# Patient Record
Sex: FEMALE | Race: WHITE | NOT HISPANIC OR LATINO
[De-identification: names, ages, dates, MRNs, and addresses within clinical notes are randomized per-mention and may not be internally consistent; named-entity substitution may affect disease eponyms.]

---

## 2022-05-05 PROBLEM — Z00.00 ENCOUNTER FOR PREVENTIVE HEALTH EXAMINATION: Status: ACTIVE | Noted: 2022-05-05

## 2022-05-17 ENCOUNTER — APPOINTMENT (OUTPATIENT)
Dept: CARDIOTHORACIC SURGERY | Facility: CLINIC | Age: 72
End: 2022-05-17
Payer: MEDICARE

## 2022-05-17 VITALS
RESPIRATION RATE: 14 BRPM | TEMPERATURE: 97.6 F | HEIGHT: 61 IN | OXYGEN SATURATION: 97 % | BODY MASS INDEX: 24.55 KG/M2 | HEART RATE: 95 BPM | WEIGHT: 130 LBS

## 2022-05-17 DIAGNOSIS — S83.249A OTHER TEAR OF MEDIAL MENISCUS, CURRENT INJURY, UNSPECIFIED KNEE, INITIAL ENCOUNTER: ICD-10-CM

## 2022-05-17 DIAGNOSIS — Z82.49 FAMILY HISTORY OF ISCHEMIC HEART DISEASE AND OTHER DISEASES OF THE CIRCULATORY SYSTEM: ICD-10-CM

## 2022-05-17 DIAGNOSIS — Z83.6 FAMILY HISTORY OF OTHER DISEASES OF THE RESPIRATORY SYSTEM: ICD-10-CM

## 2022-05-17 DIAGNOSIS — Z86.79 PERSONAL HISTORY OF OTHER DISEASES OF THE CIRCULATORY SYSTEM: ICD-10-CM

## 2022-05-17 DIAGNOSIS — Z87.891 PERSONAL HISTORY OF NICOTINE DEPENDENCE: ICD-10-CM

## 2022-05-17 DIAGNOSIS — I65.23 OCCLUSION AND STENOSIS OF BILATERAL CAROTID ARTERIES: ICD-10-CM

## 2022-05-17 DIAGNOSIS — I35.0 NONRHEUMATIC AORTIC (VALVE) STENOSIS: ICD-10-CM

## 2022-05-17 DIAGNOSIS — E78.5 HYPERLIPIDEMIA, UNSPECIFIED: ICD-10-CM

## 2022-05-17 DIAGNOSIS — I25.10 ATHEROSCLEROTIC HEART DISEASE OF NATIVE CORONARY ARTERY W/OUT ANGINA PECTORIS: ICD-10-CM

## 2022-05-17 DIAGNOSIS — Z86.59 PERSONAL HISTORY OF OTHER MENTAL AND BEHAVIORAL DISORDERS: ICD-10-CM

## 2022-05-17 PROCEDURE — 99214 OFFICE O/P EST MOD 30 MIN: CPT

## 2022-05-17 NOTE — CONSULT LETTER
[Dear  ___] : Dear ~WADE, [Courtesy Letter:] : I had the pleasure of seeing your patient, [unfilled], in my office today. [Please see my note below.] : Please see my note below. [Consult Closing:] : Thank you very much for allowing me to participate in the care of this patient.  If you have any questions, please do not hesitate to contact me. [Sincerely,] : Sincerely, [FreeTextEntry2] : Ayesha Humphries MD\par [address]\par [place] [id number] [FreeTextEntry3] : Len Chang MD\par  & \par \par Cardiovascular & Thoracic Surgery\par [facility] \par [address]\par [facility] [address]\par

## 2022-05-17 NOTE — HISTORY OF PRESENT ILLNESS
[FreeTextEntry1] : Gwen is a 71 year old female with past medical history of known heart murmur, HTN, HLD, visual disturbances, anxiety disorder, carotid artery disease, aortic valve disease and occlusive coronary artery disease. She was revealed for surgical consultation by Dr. Humphries.  In 2020 she underwent a routine physical exam with her PCP EKG was found to be abnormal. She followed up with cardiology (Dr. Humphries) which included an echocardiogram.  At that time Dr. Humphries told patient her aortic valve was stenotic but did not warrant surgery at that time. Recent echocardiogram showed progression of her aortic valve disease and recent cardiac catherization showed two vessel coronary artery disease. She was referred for surgical consultation.  \par \par Today she denies angina, SOB, SANCHEZ, PND, orthopnea or syncope. Her appetite is good, bowel and bladder habits are normal. She is an active skier but this year she did not go this year due to her recent echocardiogram results as well as Dr. Humphries's recommendations. 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 \par \par Cardiac Catherization on 4/29/2022 by Dr. Jennings demonstrated\par Left Cx 70%, \par RCA 85%\par LAD 30%\par \par TTE 2/2/2022 demonstrated\par severe AS, pGr 88 mmHg, mGr 49 mmHg, \par EF is normal

## 2022-05-17 NOTE — REVIEW OF SYSTEMS
[Feeling Tired] : feeling tired [Arthralgias] : arthralgias [Joint Swelling] : joint swelling [Joint Stiffness] : joint stiffness [Anxiety] : anxiety [Negative] : Gastrointestinal [Chest Pain] : no chest pain [Palpitations] : no palpitations [Lower Ext Edema] : no lower extremity edema [SOB on Exertion] : no shortness of breath during exertion [Dizziness] : no dizziness [Fainting] : no fainting [Easy Bleeding] : no tendency for easy bleeding

## 2022-05-17 NOTE — REASON FOR VISIT
[Initial Evaluation] : an initial evaluation [Spouse] : spouse [FreeTextEntry1] : coronary artery disease and aortic valve disease

## 2022-05-17 NOTE — ASSESSMENT
[FreeTextEntry1] : Gwen is a 71 year old female with past medical history of known heart murmur, HTN, HLD, visual disturbances, anxiety disorder, carotid artery disease, aortic valve disease and occlusive coronary artery disease. She was revealed for surgical consultation by Dr. Humphries.  In 2020 she underwent a routine physical exam with her PCP EKG was found to be abnormal. She followed up with cardiology (Dr. Humphries) which included an echocardiogram.  At that time Dr. Humphries told patient her aortic valve was stenotic but did not warrant surgery at that time. Recent echocardiogram showed progression of her aortic valve disease and recent cardiac catherization showed two vessel coronary artery disease. She was referred for surgical consultation.  \par \par Today she denies angina, SOB, SANCHEZ, PND, orthopnea or syncope. Her appetite is good, bowel and bladder habits are normal. She is an active skier but this year she did not go this year due to her recent echocardiogram results as well as Dr. Humphries's recommendations. 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 \par \par Cardiac Catherization on 4/29/2022 by Dr. Jennings demonstrated\par Left Cx 70%, \par RCA 85%\par LAD 30%\par \par TTE 2/2/2022 demonstrated\par severe AS, pGr 88 mmHg, mGr 49 mmHg, \par EF is normal\par \par I have reviewed the patient's medical records, diagnostic images during the time of this office consultation and have made the following recommendations.  Review of the imaging shows severe AS and occlusive coronary artery disease that will require surgical intervention. \par \par Plan:\par 1. Coronary artery revascularization and aortic valve replacement \par 2. Continue medication regimen (Toprol 25mg daily)\par 3. Follow up with cardiologist and PCP\par 4. STOP ASPIRIN TODAY for UPCOMING SURGERY \par 5. Will obtain CATH films \par

## 2022-05-17 NOTE — END OF VISIT
[FreeTextEntry2] : I personally performed the services described in the documentation, reviewed the documentation recorded by the scribe in my presence and it accurately and completely records my words and actions.\par \par I, Olena Luis NP, am scribing for and the presence of Dr. Len Chang, the following sections HISTORY OF PRESENT ILLNESS, PAST MEDICAL/FAMILY/SOCIAL HISTORY; REVIEW OF SYSTEMS; VITAL SIGNS; PHYSICAL EXAM; DISPOSITION.\par

## 2022-05-17 NOTE — PHYSICAL EXAM
[General Appearance - Alert] : alert [Jugular Venous Distention Increased] : there was no jugular-venous distention [] : no respiratory distress [Respiration, Rhythm And Depth] : normal respiratory rhythm and effort [III] : a grade 3 [II] : a grade 2 [Examination Of The Chest] : the chest was normal in appearance [Bowel Sounds] : normal bowel sounds [Abdomen Soft] : soft [No CVA Tenderness] : no ~M costovertebral angle tenderness [Involuntary Movements] : no involuntary movements were seen [Skin Color & Pigmentation] : normal skin color and pigmentation [No Focal Deficits] : no focal deficits [Oriented To Time, Place, And Person] : oriented to person, place, and time [Impaired Insight] : insight and judgment were intact [Right Carotid Bruit] : no bruit heard over the right carotid [Left Carotid Bruit] : no bruit heard over the left carotid [FreeTextEntry1] : deferred

## 2022-05-17 NOTE — DATA REVIEWED
[FreeTextEntry1] : Cardiac Catherization on 4/29/2022 by Dr. Jennings demonstrated\par Left Cx 70%, \par RCA 85%\par LAD 30%\par \par TTE 2/2/2022 demonstrated\par severe AS, pGr 88mmHg, mGr 49 mmHg, \par EF is normal

## 2022-05-20 ENCOUNTER — APPOINTMENT (OUTPATIENT)
Dept: ULTRASOUND IMAGING | Facility: HOSPITAL | Age: 72
End: 2022-05-20

## 2022-05-20 ENCOUNTER — OUTPATIENT (OUTPATIENT)
Dept: OUTPATIENT SERVICES | Facility: HOSPITAL | Age: 72
LOS: 1 days | End: 2022-05-20
Payer: MEDICARE

## 2022-05-20 VITALS
DIASTOLIC BLOOD PRESSURE: 88 MMHG | HEART RATE: 72 BPM | TEMPERATURE: 98 F | RESPIRATION RATE: 18 BRPM | OXYGEN SATURATION: 99 % | HEIGHT: 61 IN | WEIGHT: 128.97 LBS | SYSTOLIC BLOOD PRESSURE: 130 MMHG

## 2022-05-20 DIAGNOSIS — Z01.818 ENCOUNTER FOR OTHER PREPROCEDURAL EXAMINATION: ICD-10-CM

## 2022-05-20 DIAGNOSIS — I25.10 ATHEROSCLEROTIC HEART DISEASE OF NATIVE CORONARY ARTERY WITHOUT ANGINA PECTORIS: ICD-10-CM

## 2022-05-20 DIAGNOSIS — I35.0 NONRHEUMATIC AORTIC (VALVE) STENOSIS: ICD-10-CM

## 2022-05-20 DIAGNOSIS — Z98.49 CATARACT EXTRACTION STATUS, UNSPECIFIED EYE: Chronic | ICD-10-CM

## 2022-05-20 LAB
A1C WITH ESTIMATED AVERAGE GLUCOSE RESULT: 6 % — HIGH (ref 4–5.6)
ANION GAP SERPL CALC-SCNC: 13 MMOL/L — SIGNIFICANT CHANGE UP (ref 5–17)
BLD GP AB SCN SERPL QL: NEGATIVE — SIGNIFICANT CHANGE UP
BUN SERPL-MCNC: 21 MG/DL — SIGNIFICANT CHANGE UP (ref 7–23)
CALCIUM SERPL-MCNC: 9.6 MG/DL — SIGNIFICANT CHANGE UP (ref 8.4–10.5)
CHLORIDE SERPL-SCNC: 102 MMOL/L — SIGNIFICANT CHANGE UP (ref 96–108)
CO2 SERPL-SCNC: 24 MMOL/L — SIGNIFICANT CHANGE UP (ref 22–31)
CREAT SERPL-MCNC: 0.77 MG/DL — SIGNIFICANT CHANGE UP (ref 0.5–1.3)
EGFR: 81 ML/MIN/1.73M2 — SIGNIFICANT CHANGE UP
ESTIMATED AVERAGE GLUCOSE: 126 MG/DL — HIGH (ref 68–114)
GLUCOSE SERPL-MCNC: 112 MG/DL — HIGH (ref 70–99)
HCT VFR BLD CALC: 42.4 % — SIGNIFICANT CHANGE UP (ref 34.5–45)
HGB BLD-MCNC: 13.9 G/DL — SIGNIFICANT CHANGE UP (ref 11.5–15.5)
MCHC RBC-ENTMCNC: 30.2 PG — SIGNIFICANT CHANGE UP (ref 27–34)
MCHC RBC-ENTMCNC: 32.8 GM/DL — SIGNIFICANT CHANGE UP (ref 32–36)
MCV RBC AUTO: 92 FL — SIGNIFICANT CHANGE UP (ref 80–100)
MRSA PCR RESULT.: SIGNIFICANT CHANGE UP
NRBC # BLD: 0 /100 WBCS — SIGNIFICANT CHANGE UP (ref 0–0)
PLATELET # BLD AUTO: 208 K/UL — SIGNIFICANT CHANGE UP (ref 150–400)
POTASSIUM SERPL-MCNC: 4.3 MMOL/L — SIGNIFICANT CHANGE UP (ref 3.5–5.3)
POTASSIUM SERPL-SCNC: 4.3 MMOL/L — SIGNIFICANT CHANGE UP (ref 3.5–5.3)
RBC # BLD: 4.61 M/UL — SIGNIFICANT CHANGE UP (ref 3.8–5.2)
RBC # FLD: 13 % — SIGNIFICANT CHANGE UP (ref 10.3–14.5)
RH IG SCN BLD-IMP: POSITIVE — SIGNIFICANT CHANGE UP
S AUREUS DNA NOSE QL NAA+PROBE: SIGNIFICANT CHANGE UP
SODIUM SERPL-SCNC: 139 MMOL/L — SIGNIFICANT CHANGE UP (ref 135–145)
WBC # BLD: 5.82 K/UL — SIGNIFICANT CHANGE UP (ref 3.8–10.5)
WBC # FLD AUTO: 5.82 K/UL — SIGNIFICANT CHANGE UP (ref 3.8–10.5)

## 2022-05-20 PROCEDURE — 86850 RBC ANTIBODY SCREEN: CPT

## 2022-05-20 PROCEDURE — 83036 HEMOGLOBIN GLYCOSYLATED A1C: CPT

## 2022-05-20 PROCEDURE — G0463: CPT

## 2022-05-20 PROCEDURE — 87641 MR-STAPH DNA AMP PROBE: CPT

## 2022-05-20 PROCEDURE — 86900 BLOOD TYPING SEROLOGIC ABO: CPT

## 2022-05-20 PROCEDURE — 80048 BASIC METABOLIC PNL TOTAL CA: CPT

## 2022-05-20 PROCEDURE — 87640 STAPH A DNA AMP PROBE: CPT

## 2022-05-20 PROCEDURE — 86901 BLOOD TYPING SEROLOGIC RH(D): CPT

## 2022-05-20 PROCEDURE — 71046 X-RAY EXAM CHEST 2 VIEWS: CPT | Mod: 26

## 2022-05-20 PROCEDURE — 71046 X-RAY EXAM CHEST 2 VIEWS: CPT

## 2022-05-20 PROCEDURE — 93880 EXTRACRANIAL BILAT STUDY: CPT | Mod: 26

## 2022-05-20 PROCEDURE — 85027 COMPLETE CBC AUTOMATED: CPT

## 2022-05-20 PROCEDURE — 93880 EXTRACRANIAL BILAT STUDY: CPT

## 2022-05-20 RX ORDER — VANCOMYCIN HCL 1 G
750 VIAL (EA) INTRAVENOUS ONCE
Refills: 0 | Status: DISCONTINUED | OUTPATIENT
Start: 2022-06-16 | End: 2022-06-16

## 2022-05-20 NOTE — H&P PST ADULT - NSICDXPASTMEDICALHX_GEN_ALL_CORE_FT
PAST MEDICAL HISTORY:  Bicuspid aortic valve     HLD (hyperlipidemia)     Hypertension      PAST MEDICAL HISTORY:  Aortic stenosis     H/O carotid stenosis     HLD (hyperlipidemia)     Hypertension

## 2022-05-20 NOTE — H&P PST ADULT - FALL HARM RISK - UNIVERSAL INTERVENTIONS
Bed in lowest position, wheels locked, appropriate side rails in place/Call bell, personal items and telephone in reach/Instruct patient to call for assistance before getting out of bed or chair/Non-slip footwear when patient is out of bed/[place] to call system/Physically safe environment - no spills, clutter or unnecessary equipment/Purposeful Proactive Rounding/Room/bathroom lighting operational, light cord in reach

## 2022-05-20 NOTE — H&P PST ADULT - HISTORY OF PRESENT ILLNESS
This is a 70 y/o female PMH CAD, bicuspid aortic valve.  Presents today for aortic valve replacement and CABG X 3.   This is a 70 y/o female PMH CAD, severe aortic stenosis, was found to have TVD on cardiac catheterization performed at [facility] in April, denies dyspnea, angina or syncope.  Presents today for aortic valve replacement and CABG X 3.      No H/O COVID infection, COVID swab to be scheduled by CTS

## 2022-05-21 ENCOUNTER — TRANSCRIPTION ENCOUNTER (OUTPATIENT)
Age: 72
End: 2022-05-21

## 2022-05-24 PROBLEM — I35.0 NONRHEUMATIC AORTIC (VALVE) STENOSIS: Chronic | Status: ACTIVE | Noted: 2022-05-20

## 2022-05-24 PROBLEM — E78.5 HYPERLIPIDEMIA, UNSPECIFIED: Chronic | Status: ACTIVE | Noted: 2022-05-20

## 2022-05-24 PROBLEM — Z86.79 PERSONAL HISTORY OF OTHER DISEASES OF THE CIRCULATORY SYSTEM: Chronic | Status: ACTIVE | Noted: 2022-05-20

## 2022-05-24 PROBLEM — I10 ESSENTIAL (PRIMARY) HYPERTENSION: Chronic | Status: ACTIVE | Noted: 2022-05-20

## 2022-06-15 ENCOUNTER — TRANSCRIPTION ENCOUNTER (OUTPATIENT)
Age: 72
End: 2022-06-15

## 2022-06-16 ENCOUNTER — APPOINTMENT (OUTPATIENT)
Dept: CARDIOTHORACIC SURGERY | Facility: HOSPITAL | Age: 72
End: 2022-06-16

## 2022-06-16 ENCOUNTER — RESULT REVIEW (OUTPATIENT)
Age: 72
End: 2022-06-16

## 2022-06-16 ENCOUNTER — INPATIENT (INPATIENT)
Facility: HOSPITAL | Age: 72
LOS: 6 days | Discharge: HOME HEALTH | DRG: 219 | End: 2022-06-23
Attending: THORACIC SURGERY (CARDIOTHORACIC VASCULAR SURGERY) | Admitting: THORACIC SURGERY (CARDIOTHORACIC VASCULAR SURGERY)
Payer: MEDICARE

## 2022-06-16 VITALS
OXYGEN SATURATION: 99 % | HEIGHT: 61 IN | SYSTOLIC BLOOD PRESSURE: 169 MMHG | HEART RATE: 95 BPM | DIASTOLIC BLOOD PRESSURE: 89 MMHG | TEMPERATURE: 98 F | RESPIRATION RATE: 18 BRPM | WEIGHT: 129.41 LBS

## 2022-06-16 DIAGNOSIS — I35.0 NONRHEUMATIC AORTIC (VALVE) STENOSIS: ICD-10-CM

## 2022-06-16 DIAGNOSIS — I25.10 ATHEROSCLEROTIC HEART DISEASE OF NATIVE CORONARY ARTERY WITHOUT ANGINA PECTORIS: ICD-10-CM

## 2022-06-16 DIAGNOSIS — Z98.49 CATARACT EXTRACTION STATUS, UNSPECIFIED EYE: Chronic | ICD-10-CM

## 2022-06-16 LAB
ALBUMIN SERPL ELPH-MCNC: 2.6 G/DL — LOW (ref 3.3–5)
ALP SERPL-CCNC: 22 U/L — LOW (ref 40–120)
ALT FLD-CCNC: 7 U/L — LOW (ref 10–45)
ANION GAP SERPL CALC-SCNC: 12 MMOL/L — SIGNIFICANT CHANGE UP (ref 5–17)
APTT BLD: 34.1 SEC — SIGNIFICANT CHANGE UP (ref 27.5–35.5)
AST SERPL-CCNC: 36 U/L — SIGNIFICANT CHANGE UP (ref 10–40)
BASE EXCESS BLDV CALC-SCNC: -0.8 MMOL/L — SIGNIFICANT CHANGE UP (ref -2–2)
BASE EXCESS BLDV CALC-SCNC: -1.8 MMOL/L — SIGNIFICANT CHANGE UP (ref -2–2)
BASE EXCESS BLDV CALC-SCNC: -3.2 MMOL/L — LOW (ref -2–2)
BASE EXCESS BLDV CALC-SCNC: -5.4 MMOL/L — LOW (ref -2–2)
BASE EXCESS BLDV CALC-SCNC: 0.3 MMOL/L — SIGNIFICANT CHANGE UP (ref -2–2)
BASE EXCESS BLDV CALC-SCNC: 0.4 MMOL/L — SIGNIFICANT CHANGE UP (ref -2–2)
BASE EXCESS BLDV CALC-SCNC: 0.5 MMOL/L — SIGNIFICANT CHANGE UP (ref -2–2)
BASE EXCESS BLDV CALC-SCNC: 0.8 MMOL/L — SIGNIFICANT CHANGE UP (ref -2–2)
BASE EXCESS BLDV CALC-SCNC: 3.8 MMOL/L — HIGH (ref -2–2)
BASE EXCESS BLDV CALC-SCNC: 3.8 MMOL/L — HIGH (ref -2–2)
BASE EXCESS BLDV CALC-SCNC: 3.9 MMOL/L — HIGH (ref -2–2)
BASE EXCESS BLDV CALC-SCNC: 5.9 MMOL/L — HIGH (ref -2–2)
BASOPHILS # BLD AUTO: 0.05 K/UL — SIGNIFICANT CHANGE UP (ref 0–0.2)
BASOPHILS NFR BLD AUTO: 0.5 % — SIGNIFICANT CHANGE UP (ref 0–2)
BILIRUB SERPL-MCNC: 0.8 MG/DL — SIGNIFICANT CHANGE UP (ref 0.2–1.2)
BLOOD GAS VENOUS - CREATININE: SIGNIFICANT CHANGE UP MG/DL (ref 0.5–1.3)
BUN SERPL-MCNC: 11 MG/DL — SIGNIFICANT CHANGE UP (ref 7–23)
CA-I SERPL-SCNC: 0.62 MMOL/L — CRITICAL LOW (ref 1.15–1.33)
CA-I SERPL-SCNC: 0.72 MMOL/L — LOW (ref 1.15–1.33)
CA-I SERPL-SCNC: 0.78 MMOL/L — LOW (ref 1.15–1.33)
CA-I SERPL-SCNC: 0.81 MMOL/L — LOW (ref 1.15–1.33)
CA-I SERPL-SCNC: 0.83 MMOL/L — LOW (ref 1.15–1.33)
CA-I SERPL-SCNC: 0.92 MMOL/L — LOW (ref 1.15–1.33)
CA-I SERPL-SCNC: 1.15 MMOL/L — SIGNIFICANT CHANGE UP (ref 1.15–1.33)
CA-I SERPL-SCNC: 1.16 MMOL/L — SIGNIFICANT CHANGE UP (ref 1.15–1.33)
CA-I SERPL-SCNC: 1.41 MMOL/L — HIGH (ref 1.15–1.33)
CALCIUM SERPL-MCNC: 7.2 MG/DL — LOW (ref 8.4–10.5)
CHLORIDE BLDV-SCNC: 101 MMOL/L — SIGNIFICANT CHANGE UP (ref 96–108)
CHLORIDE BLDV-SCNC: 103 MMOL/L — SIGNIFICANT CHANGE UP (ref 96–108)
CHLORIDE BLDV-SCNC: 105 MMOL/L — SIGNIFICANT CHANGE UP (ref 96–108)
CHLORIDE BLDV-SCNC: 105 MMOL/L — SIGNIFICANT CHANGE UP (ref 96–108)
CHLORIDE BLDV-SCNC: 110 MMOL/L — HIGH (ref 96–108)
CHLORIDE BLDV-SCNC: 111 MMOL/L — HIGH (ref 96–108)
CHLORIDE BLDV-SCNC: 112 MMOL/L — HIGH (ref 96–108)
CHLORIDE BLDV-SCNC: 113 MMOL/L — HIGH (ref 96–108)
CHLORIDE BLDV-SCNC: 115 MMOL/L — HIGH (ref 96–108)
CHLORIDE SERPL-SCNC: 118 MMOL/L — HIGH (ref 96–108)
CK MB BLD-MCNC: 11.4 % — HIGH (ref 0–3.5)
CK MB CFR SERPL CALC: 41.5 NG/ML — HIGH (ref 0–3.8)
CK SERPL-CCNC: 365 U/L — HIGH (ref 25–170)
CO2 BLDV-SCNC: 23 MMOL/L — SIGNIFICANT CHANGE UP (ref 22–26)
CO2 BLDV-SCNC: 26 MMOL/L — SIGNIFICANT CHANGE UP (ref 22–26)
CO2 BLDV-SCNC: 26 MMOL/L — SIGNIFICANT CHANGE UP (ref 22–26)
CO2 BLDV-SCNC: 27 MMOL/L — HIGH (ref 22–26)
CO2 BLDV-SCNC: 28 MMOL/L — HIGH (ref 22–26)
CO2 BLDV-SCNC: 28 MMOL/L — HIGH (ref 22–26)
CO2 BLDV-SCNC: 29 MMOL/L — HIGH (ref 22–26)
CO2 BLDV-SCNC: 30 MMOL/L — HIGH (ref 22–26)
CO2 BLDV-SCNC: 33 MMOL/L — HIGH (ref 22–26)
CO2 SERPL-SCNC: 22 MMOL/L — SIGNIFICANT CHANGE UP (ref 22–31)
CREAT SERPL-MCNC: 0.57 MG/DL — SIGNIFICANT CHANGE UP (ref 0.5–1.3)
EGFR: 96 ML/MIN/1.73M2 — SIGNIFICANT CHANGE UP
EOSINOPHIL # BLD AUTO: 0.04 K/UL — SIGNIFICANT CHANGE UP (ref 0–0.5)
EOSINOPHIL NFR BLD AUTO: 0.4 % — SIGNIFICANT CHANGE UP (ref 0–6)
FIBRINOGEN PPP-MCNC: 210 MG/DL — LOW (ref 330–520)
GAS PNL BLDA: SIGNIFICANT CHANGE UP
GAS PNL BLDV: 135 MMOL/L — LOW (ref 136–145)
GAS PNL BLDV: 135 MMOL/L — LOW (ref 136–145)
GAS PNL BLDV: 137 MMOL/L — SIGNIFICANT CHANGE UP (ref 136–145)
GAS PNL BLDV: 138 MMOL/L — SIGNIFICANT CHANGE UP (ref 136–145)
GAS PNL BLDV: 140 MMOL/L — SIGNIFICANT CHANGE UP (ref 136–145)
GAS PNL BLDV: 142 MMOL/L — SIGNIFICANT CHANGE UP (ref 136–145)
GAS PNL BLDV: 143 MMOL/L — SIGNIFICANT CHANGE UP (ref 136–145)
GAS PNL BLDV: 144 MMOL/L — SIGNIFICANT CHANGE UP (ref 136–145)
GAS PNL BLDV: 144 MMOL/L — SIGNIFICANT CHANGE UP (ref 136–145)
GAS PNL BLDV: SIGNIFICANT CHANGE UP
GLUCOSE BLDV-MCNC: 108 MG/DL — HIGH (ref 70–99)
GLUCOSE BLDV-MCNC: 112 MG/DL — HIGH (ref 70–99)
GLUCOSE BLDV-MCNC: 117 MG/DL — HIGH (ref 70–99)
GLUCOSE BLDV-MCNC: 118 MG/DL — HIGH (ref 70–99)
GLUCOSE BLDV-MCNC: 133 MG/DL — HIGH (ref 70–99)
GLUCOSE BLDV-MCNC: 135 MG/DL — HIGH (ref 70–99)
GLUCOSE BLDV-MCNC: 135 MG/DL — HIGH (ref 70–99)
GLUCOSE BLDV-MCNC: 157 MG/DL — HIGH (ref 70–99)
GLUCOSE BLDV-MCNC: >660 MG/DL — CRITICAL HIGH (ref 70–99)
GLUCOSE SERPL-MCNC: 133 MG/DL — HIGH (ref 70–99)
HCO3 BLDV-SCNC: 22 MMOL/L — SIGNIFICANT CHANGE UP (ref 22–29)
HCO3 BLDV-SCNC: 25 MMOL/L — SIGNIFICANT CHANGE UP (ref 22–29)
HCO3 BLDV-SCNC: 26 MMOL/L — SIGNIFICANT CHANGE UP (ref 22–29)
HCO3 BLDV-SCNC: 27 MMOL/L — SIGNIFICANT CHANGE UP (ref 22–29)
HCO3 BLDV-SCNC: 28 MMOL/L — SIGNIFICANT CHANGE UP (ref 22–29)
HCO3 BLDV-SCNC: 29 MMOL/L — SIGNIFICANT CHANGE UP (ref 22–29)
HCO3 BLDV-SCNC: 31 MMOL/L — HIGH (ref 22–29)
HCT VFR BLD CALC: 25.1 % — LOW (ref 34.5–45)
HCT VFR BLD CALC: 25.9 % — LOW (ref 34.5–45)
HCT VFR BLDA CALC: 20 % — CRITICAL LOW (ref 34.5–46.5)
HCT VFR BLDA CALC: 21 % — CRITICAL LOW (ref 34.5–46.5)
HCT VFR BLDA CALC: 22 % — LOW (ref 34.5–46.5)
HCT VFR BLDA CALC: 23 % — LOW (ref 34.5–46.5)
HCT VFR BLDA CALC: 24 % — LOW (ref 34.5–46.5)
HCT VFR BLDA CALC: 27 % — LOW (ref 34.5–46.5)
HCT VFR BLDA CALC: 38 % — SIGNIFICANT CHANGE UP (ref 34.5–46.5)
HGB BLD CALC-MCNC: 12.5 G/DL — SIGNIFICANT CHANGE UP (ref 11.7–16.1)
HGB BLD CALC-MCNC: 6.8 G/DL — CRITICAL LOW (ref 11.7–16.1)
HGB BLD CALC-MCNC: 7 G/DL — CRITICAL LOW (ref 11.7–16.1)
HGB BLD CALC-MCNC: 7.2 G/DL — LOW (ref 11.7–16.1)
HGB BLD CALC-MCNC: 7.5 G/DL — LOW (ref 11.7–16.1)
HGB BLD CALC-MCNC: 8.1 G/DL — LOW (ref 11.7–16.1)
HGB BLD CALC-MCNC: 8.9 G/DL — LOW (ref 11.7–16.1)
HGB BLD-MCNC: 8.5 G/DL — LOW (ref 11.5–15.5)
HGB BLD-MCNC: 8.5 G/DL — LOW (ref 11.5–15.5)
HOROWITZ INDEX BLDV+IHG-RTO: 100 — SIGNIFICANT CHANGE UP
HOROWITZ INDEX BLDV+IHG-RTO: 40 — SIGNIFICANT CHANGE UP
HOROWITZ INDEX BLDV+IHG-RTO: 40 — SIGNIFICANT CHANGE UP
IMM GRANULOCYTES NFR BLD AUTO: 0.8 % — SIGNIFICANT CHANGE UP (ref 0–1.5)
INR BLD: 1.82 RATIO — HIGH (ref 0.88–1.16)
LACTATE BLDV-MCNC: 0.7 MMOL/L — SIGNIFICANT CHANGE UP (ref 0.7–2)
LACTATE BLDV-MCNC: 0.8 MMOL/L — SIGNIFICANT CHANGE UP (ref 0.7–2)
LACTATE BLDV-MCNC: 0.9 MMOL/L — SIGNIFICANT CHANGE UP (ref 0.7–2)
LACTATE BLDV-MCNC: 1 MMOL/L — SIGNIFICANT CHANGE UP (ref 0.7–2)
LACTATE BLDV-MCNC: 1.1 MMOL/L — SIGNIFICANT CHANGE UP (ref 0.7–2)
LACTATE BLDV-MCNC: 1.2 MMOL/L — SIGNIFICANT CHANGE UP (ref 0.7–2)
LACTATE BLDV-MCNC: 1.3 MMOL/L — SIGNIFICANT CHANGE UP (ref 0.7–2)
LYMPHOCYTES # BLD AUTO: 2.22 K/UL — SIGNIFICANT CHANGE UP (ref 1–3.3)
LYMPHOCYTES # BLD AUTO: 21.9 % — SIGNIFICANT CHANGE UP (ref 13–44)
MAGNESIUM SERPL-MCNC: 3.8 MG/DL — HIGH (ref 1.6–2.6)
MCHC RBC-ENTMCNC: 30.1 PG — SIGNIFICANT CHANGE UP (ref 27–34)
MCHC RBC-ENTMCNC: 30.8 PG — SIGNIFICANT CHANGE UP (ref 27–34)
MCHC RBC-ENTMCNC: 32.8 GM/DL — SIGNIFICANT CHANGE UP (ref 32–36)
MCHC RBC-ENTMCNC: 33.9 GM/DL — SIGNIFICANT CHANGE UP (ref 32–36)
MCV RBC AUTO: 90.9 FL — SIGNIFICANT CHANGE UP (ref 80–100)
MCV RBC AUTO: 91.8 FL — SIGNIFICANT CHANGE UP (ref 80–100)
MONOCYTES # BLD AUTO: 0.57 K/UL — SIGNIFICANT CHANGE UP (ref 0–0.9)
MONOCYTES NFR BLD AUTO: 5.6 % — SIGNIFICANT CHANGE UP (ref 2–14)
NEUTROPHILS # BLD AUTO: 7.19 K/UL — SIGNIFICANT CHANGE UP (ref 1.8–7.4)
NEUTROPHILS NFR BLD AUTO: 70.8 % — SIGNIFICANT CHANGE UP (ref 43–77)
NRBC # BLD: 0 /100 WBCS — SIGNIFICANT CHANGE UP (ref 0–0)
NRBC # BLD: 0 /100 WBCS — SIGNIFICANT CHANGE UP (ref 0–0)
PCO2 BLDV: 35 MMHG — LOW (ref 39–42)
PCO2 BLDV: 37 MMHG — LOW (ref 39–42)
PCO2 BLDV: 37 MMHG — LOW (ref 39–42)
PCO2 BLDV: 41 MMHG — SIGNIFICANT CHANGE UP (ref 39–42)
PCO2 BLDV: 43 MMHG — HIGH (ref 39–42)
PCO2 BLDV: 46 MMHG — HIGH (ref 39–42)
PCO2 BLDV: 47 MMHG — HIGH (ref 39–42)
PCO2 BLDV: 47 MMHG — HIGH (ref 39–42)
PCO2 BLDV: 48 MMHG — HIGH (ref 39–42)
PCO2 BLDV: 49 MMHG — HIGH (ref 39–42)
PCO2 BLDV: 55 MMHG — HIGH (ref 39–42)
PCO2 BLDV: 55 MMHG — HIGH (ref 39–42)
PH BLDV: 7.26 — LOW (ref 7.32–7.43)
PH BLDV: 7.26 — LOW (ref 7.32–7.43)
PH BLDV: 7.28 — LOW (ref 7.32–7.43)
PH BLDV: 7.34 — SIGNIFICANT CHANGE UP (ref 7.32–7.43)
PH BLDV: 7.36 — SIGNIFICANT CHANGE UP (ref 7.32–7.43)
PH BLDV: 7.39 — SIGNIFICANT CHANGE UP (ref 7.32–7.43)
PH BLDV: 7.4 — SIGNIFICANT CHANGE UP (ref 7.32–7.43)
PH BLDV: 7.4 — SIGNIFICANT CHANGE UP (ref 7.32–7.43)
PH BLDV: 7.41 — SIGNIFICANT CHANGE UP (ref 7.32–7.43)
PH BLDV: 7.43 — SIGNIFICANT CHANGE UP (ref 7.32–7.43)
PH BLDV: 7.48 — HIGH (ref 7.32–7.43)
PH BLDV: 7.5 — HIGH (ref 7.32–7.43)
PHOSPHATE SERPL-MCNC: 1.4 MG/DL — LOW (ref 2.5–4.5)
PLATELET # BLD AUTO: 45 K/UL — LOW (ref 150–400)
PLATELET # BLD AUTO: 49 K/UL — LOW (ref 150–400)
PO2 BLDV: 150 MMHG — HIGH (ref 25–45)
PO2 BLDV: 184 MMHG — HIGH (ref 25–45)
PO2 BLDV: 192 MMHG — HIGH (ref 25–45)
PO2 BLDV: 200 MMHG — HIGH (ref 25–45)
PO2 BLDV: 213 MMHG — HIGH (ref 25–45)
PO2 BLDV: 215 MMHG — HIGH (ref 25–45)
PO2 BLDV: 37 MMHG — SIGNIFICANT CHANGE UP (ref 25–45)
PO2 BLDV: 46 MMHG — HIGH (ref 25–45)
PO2 BLDV: 56 MMHG — HIGH (ref 25–45)
PO2 BLDV: 61 MMHG — HIGH (ref 25–45)
PO2 BLDV: 77 MMHG — HIGH (ref 25–45)
PO2 BLDV: 87 MMHG — HIGH (ref 25–45)
POTASSIUM BLDV-SCNC: 3.7 MMOL/L — SIGNIFICANT CHANGE UP (ref 3.5–5.1)
POTASSIUM BLDV-SCNC: 4 MMOL/L — SIGNIFICANT CHANGE UP (ref 3.5–5.1)
POTASSIUM BLDV-SCNC: 5 MMOL/L — SIGNIFICANT CHANGE UP (ref 3.5–5.1)
POTASSIUM BLDV-SCNC: 6 MMOL/L — HIGH (ref 3.5–5.1)
POTASSIUM BLDV-SCNC: 6.2 MMOL/L — CRITICAL HIGH (ref 3.5–5.1)
POTASSIUM BLDV-SCNC: 6.3 MMOL/L — CRITICAL HIGH (ref 3.5–5.1)
POTASSIUM BLDV-SCNC: 7.2 MMOL/L — CRITICAL HIGH (ref 3.5–5.1)
POTASSIUM BLDV-SCNC: 7.3 MMOL/L — CRITICAL HIGH (ref 3.5–5.1)
POTASSIUM BLDV-SCNC: 7.5 MMOL/L — CRITICAL HIGH (ref 3.5–5.1)
POTASSIUM SERPL-MCNC: 3.9 MMOL/L — SIGNIFICANT CHANGE UP (ref 3.5–5.3)
POTASSIUM SERPL-SCNC: 3.9 MMOL/L — SIGNIFICANT CHANGE UP (ref 3.5–5.3)
PROT SERPL-MCNC: 3.5 G/DL — LOW (ref 6–8.3)
PROTHROM AB SERPL-ACNC: 21.1 SEC — HIGH (ref 10.5–13.4)
RBC # BLD: 2.76 M/UL — LOW (ref 3.8–5.2)
RBC # BLD: 2.82 M/UL — LOW (ref 3.8–5.2)
RBC # FLD: 14.3 % — SIGNIFICANT CHANGE UP (ref 10.3–14.5)
RBC # FLD: 14.8 % — HIGH (ref 10.3–14.5)
SAO2 % BLDV: 100 % — HIGH (ref 67–88)
SAO2 % BLDV: 100 % — HIGH (ref 67–88)
SAO2 % BLDV: 65.5 % — LOW (ref 67–88)
SAO2 % BLDV: 74.8 % — SIGNIFICANT CHANGE UP (ref 67–88)
SAO2 % BLDV: 88 % — SIGNIFICANT CHANGE UP (ref 67–88)
SAO2 % BLDV: 92 % — HIGH (ref 67–88)
SAO2 % BLDV: 98.5 % — HIGH (ref 67–88)
SAO2 % BLDV: 98.8 % — HIGH (ref 67–88)
SAO2 % BLDV: 99 % — HIGH (ref 67–88)
SAO2 % BLDV: 99.2 % — HIGH (ref 67–88)
SAO2 % BLDV: 99.6 % — HIGH (ref 67–88)
SAO2 % BLDV: 99.7 % — HIGH (ref 67–88)
SODIUM SERPL-SCNC: 152 MMOL/L — HIGH (ref 135–145)
TROPONIN T, HIGH SENSITIVITY RESULT: 1512 NG/L — HIGH (ref 0–51)
WBC # BLD: 10.15 K/UL — SIGNIFICANT CHANGE UP (ref 3.8–10.5)
WBC # BLD: 5.17 K/UL — SIGNIFICANT CHANGE UP (ref 3.8–10.5)
WBC # FLD AUTO: 10.15 K/UL — SIGNIFICANT CHANGE UP (ref 3.8–10.5)
WBC # FLD AUTO: 5.17 K/UL — SIGNIFICANT CHANGE UP (ref 3.8–10.5)

## 2022-06-16 PROCEDURE — 99291 CRITICAL CARE FIRST HOUR: CPT

## 2022-06-16 PROCEDURE — 33511 CABG VEIN TWO: CPT

## 2022-06-16 PROCEDURE — 71045 X-RAY EXAM CHEST 1 VIEW: CPT | Mod: 26

## 2022-06-16 PROCEDURE — 93010 ELECTROCARDIOGRAM REPORT: CPT

## 2022-06-16 PROCEDURE — 33411 REPLACEMENT OF AORTIC VALVE: CPT | Mod: 59

## 2022-06-16 PROCEDURE — 88305 TISSUE EXAM BY PATHOLOGIST: CPT | Mod: 26

## 2022-06-16 PROCEDURE — 33405 REPLACEMENT AORTIC VALVE OPN: CPT

## 2022-06-16 DEVICE — CANNULA AORTIC ROOT 14G X 14CM FLANGED: Type: IMPLANTABLE DEVICE | Status: FUNCTIONAL

## 2022-06-16 DEVICE — SURGICEL FIBRILLAR 2 X 4": Type: IMPLANTABLE DEVICE | Status: FUNCTIONAL

## 2022-06-16 DEVICE — CANNULA RCSP 15FR X 12.5" AUTO-INFLATE CUFF WITH SOLID STYLET: Type: IMPLANTABLE DEVICE | Status: FUNCTIONAL

## 2022-06-16 DEVICE — SURGICEL 4 X 8": Type: IMPLANTABLE DEVICE | Status: FUNCTIONAL

## 2022-06-16 DEVICE — CANNULA VENOUS 2 STAGE LIGHTHOUSE TIP 28-38FR X 1/2" NON-VENTED: Type: IMPLANTABLE DEVICE | Status: FUNCTIONAL

## 2022-06-16 DEVICE — KIT A-LINE 1LUM 20G X 12CM SAFE KIT: Type: IMPLANTABLE DEVICE | Status: FUNCTIONAL

## 2022-06-16 DEVICE — KIT CVC 2LUM MAC 9FR CHG: Type: IMPLANTABLE DEVICE | Status: FUNCTIONAL

## 2022-06-16 DEVICE — MEDIASTINAL CATH DRAIN 9MM: Type: IMPLANTABLE DEVICE | Status: FUNCTIONAL

## 2022-06-16 DEVICE — LIGATING CLIPS WECK HORIZON LARGE (ORANGE) 6: Type: IMPLANTABLE DEVICE | Status: FUNCTIONAL

## 2022-06-16 DEVICE — PACING WIRE WHITE M-24 BAREWIRE 37MM X 89MM: Type: IMPLANTABLE DEVICE | Status: FUNCTIONAL

## 2022-06-16 DEVICE — LIGATING CLIPS WECK HORIZON SMALL-WIDE (RED) 24: Type: IMPLANTABLE DEVICE | Status: FUNCTIONAL

## 2022-06-16 DEVICE — VALVE AORTIC INSPIRIS RESILIA 21MM: Type: IMPLANTABLE DEVICE | Status: FUNCTIONAL

## 2022-06-16 DEVICE — LIGATING CLIPS WECK HORIZON MEDIUM (BLUE) 24: Type: IMPLANTABLE DEVICE | Status: FUNCTIONAL

## 2022-06-16 DEVICE — CANNULA RCSP 15FR X 12".5" MANUAL-INFLATE CUFF WITH GUIDEWIRE STYLET: Type: IMPLANTABLE DEVICE | Status: FUNCTIONAL

## 2022-06-16 DEVICE — CANNULA AORTIC PERFUSION EZ GLIDE STRAIGHT 21FR X 35CM VENTED: Type: IMPLANTABLE DEVICE | Status: FUNCTIONAL

## 2022-06-16 DEVICE — PERI GUARD PERICARDIUM 4X4: Type: IMPLANTABLE DEVICE | Status: FUNCTIONAL

## 2022-06-16 RX ORDER — ALBUMIN HUMAN 25 %
250 VIAL (ML) INTRAVENOUS
Refills: 0 | Status: COMPLETED | OUTPATIENT
Start: 2022-06-16 | End: 2022-06-16

## 2022-06-16 RX ORDER — NICARDIPINE HYDROCHLORIDE 30 MG/1
5 CAPSULE, EXTENDED RELEASE ORAL
Qty: 40 | Refills: 0 | Status: DISCONTINUED | OUTPATIENT
Start: 2022-06-16 | End: 2022-06-16

## 2022-06-16 RX ORDER — GABAPENTIN 400 MG/1
100 CAPSULE ORAL EVERY 8 HOURS
Refills: 0 | Status: COMPLETED | OUTPATIENT
Start: 2022-06-16 | End: 2022-06-21

## 2022-06-16 RX ORDER — CHLORHEXIDINE GLUCONATE 213 G/1000ML
15 SOLUTION TOPICAL EVERY 12 HOURS
Refills: 0 | Status: DISCONTINUED | OUTPATIENT
Start: 2022-06-16 | End: 2022-06-16

## 2022-06-16 RX ORDER — POTASSIUM CHLORIDE 20 MEQ
10 PACKET (EA) ORAL
Refills: 0 | Status: DISCONTINUED | OUTPATIENT
Start: 2022-06-16 | End: 2022-06-16

## 2022-06-16 RX ORDER — SODIUM CHLORIDE 9 MG/ML
1000 INJECTION INTRAMUSCULAR; INTRAVENOUS; SUBCUTANEOUS
Refills: 0 | Status: DISCONTINUED | OUTPATIENT
Start: 2022-06-16 | End: 2022-06-18

## 2022-06-16 RX ORDER — DOBUTAMINE HCL 250MG/20ML
2.5 VIAL (ML) INTRAVENOUS
Qty: 500 | Refills: 0 | Status: DISCONTINUED | OUTPATIENT
Start: 2022-06-16 | End: 2022-06-17

## 2022-06-16 RX ORDER — ASCORBIC ACID 60 MG
500 TABLET,CHEWABLE ORAL
Refills: 0 | Status: COMPLETED | OUTPATIENT
Start: 2022-06-16 | End: 2022-06-21

## 2022-06-16 RX ORDER — ALBUMIN HUMAN 25 %
250 VIAL (ML) INTRAVENOUS ONCE
Refills: 0 | Status: COMPLETED | OUTPATIENT
Start: 2022-06-16 | End: 2022-06-16

## 2022-06-16 RX ORDER — ACETAMINOPHEN 500 MG
650 TABLET ORAL EVERY 6 HOURS
Refills: 0 | Status: COMPLETED | OUTPATIENT
Start: 2022-06-16 | End: 2022-06-19

## 2022-06-16 RX ORDER — POTASSIUM CHLORIDE 20 MEQ
10 PACKET (EA) ORAL
Refills: 0 | Status: COMPLETED | OUTPATIENT
Start: 2022-06-16 | End: 2022-06-16

## 2022-06-16 RX ORDER — HYDROMORPHONE HYDROCHLORIDE 2 MG/ML
0.5 INJECTION INTRAMUSCULAR; INTRAVENOUS; SUBCUTANEOUS ONCE
Refills: 0 | Status: DISCONTINUED | OUTPATIENT
Start: 2022-06-16 | End: 2022-06-16

## 2022-06-16 RX ORDER — CHLORHEXIDINE GLUCONATE 213 G/1000ML
1 SOLUTION TOPICAL DAILY
Refills: 0 | Status: DISCONTINUED | OUTPATIENT
Start: 2022-06-16 | End: 2022-06-17

## 2022-06-16 RX ORDER — ONDANSETRON 8 MG/1
4 TABLET, FILM COATED ORAL ONCE
Refills: 0 | Status: COMPLETED | OUTPATIENT
Start: 2022-06-16 | End: 2022-06-16

## 2022-06-16 RX ORDER — HYDROMORPHONE HYDROCHLORIDE 2 MG/ML
0.5 INJECTION INTRAMUSCULAR; INTRAVENOUS; SUBCUTANEOUS EVERY 6 HOURS
Refills: 0 | Status: DISCONTINUED | OUTPATIENT
Start: 2022-06-16 | End: 2022-06-17

## 2022-06-16 RX ORDER — ATORVASTATIN CALCIUM 80 MG/1
80 TABLET, FILM COATED ORAL AT BEDTIME
Refills: 0 | Status: DISCONTINUED | OUTPATIENT
Start: 2022-06-16 | End: 2022-06-23

## 2022-06-16 RX ORDER — PANTOPRAZOLE SODIUM 20 MG/1
40 TABLET, DELAYED RELEASE ORAL DAILY
Refills: 0 | Status: DISCONTINUED | OUTPATIENT
Start: 2022-06-16 | End: 2022-06-17

## 2022-06-16 RX ORDER — SODIUM CHLORIDE 9 MG/ML
3 INJECTION INTRAMUSCULAR; INTRAVENOUS; SUBCUTANEOUS EVERY 8 HOURS
Refills: 0 | Status: DISCONTINUED | OUTPATIENT
Start: 2022-06-16 | End: 2022-06-16

## 2022-06-16 RX ORDER — OXYCODONE HYDROCHLORIDE 5 MG/1
10 TABLET ORAL EVERY 4 HOURS
Refills: 0 | Status: DISCONTINUED | OUTPATIENT
Start: 2022-06-16 | End: 2022-06-16

## 2022-06-16 RX ORDER — AMIODARONE HYDROCHLORIDE 400 MG/1
400 TABLET ORAL
Refills: 0 | Status: DISCONTINUED | OUTPATIENT
Start: 2022-06-16 | End: 2022-06-19

## 2022-06-16 RX ORDER — LIDOCAINE HCL 20 MG/ML
0.2 VIAL (ML) INJECTION ONCE
Refills: 0 | Status: DISCONTINUED | OUTPATIENT
Start: 2022-06-16 | End: 2022-06-16

## 2022-06-16 RX ORDER — SENNA PLUS 8.6 MG/1
2 TABLET ORAL AT BEDTIME
Refills: 0 | Status: DISCONTINUED | OUTPATIENT
Start: 2022-06-17 | End: 2022-06-23

## 2022-06-16 RX ORDER — DEXTROSE 50 % IN WATER 50 %
25 SYRINGE (ML) INTRAVENOUS
Refills: 0 | Status: DISCONTINUED | OUTPATIENT
Start: 2022-06-16 | End: 2022-06-16

## 2022-06-16 RX ORDER — AZTREONAM 2 G
1000 VIAL (EA) INJECTION EVERY 8 HOURS
Refills: 0 | Status: COMPLETED | OUTPATIENT
Start: 2022-06-16 | End: 2022-06-18

## 2022-06-16 RX ORDER — NOREPINEPHRINE BITARTRATE/D5W 8 MG/250ML
0.05 PLASTIC BAG, INJECTION (ML) INTRAVENOUS
Qty: 8 | Refills: 0 | Status: DISCONTINUED | OUTPATIENT
Start: 2022-06-16 | End: 2022-06-16

## 2022-06-16 RX ORDER — SODIUM CHLORIDE 9 MG/ML
1000 INJECTION, SOLUTION INTRAVENOUS ONCE
Refills: 0 | Status: COMPLETED | OUTPATIENT
Start: 2022-06-16 | End: 2022-06-16

## 2022-06-16 RX ORDER — VANCOMYCIN HCL 1 G
750 VIAL (EA) INTRAVENOUS EVERY 12 HOURS
Refills: 0 | Status: COMPLETED | OUTPATIENT
Start: 2022-06-16 | End: 2022-06-17

## 2022-06-16 RX ORDER — INSULIN HUMAN 100 [IU]/ML
3 INJECTION, SOLUTION SUBCUTANEOUS
Qty: 100 | Refills: 0 | Status: DISCONTINUED | OUTPATIENT
Start: 2022-06-16 | End: 2022-06-17

## 2022-06-16 RX ORDER — ACETAMINOPHEN 500 MG
650 TABLET ORAL EVERY 6 HOURS
Refills: 0 | Status: DISCONTINUED | OUTPATIENT
Start: 2022-06-19 | End: 2022-06-23

## 2022-06-16 RX ORDER — EPINEPHRINE 0.3 MG/.3ML
0.01 INJECTION INTRAMUSCULAR; SUBCUTANEOUS
Qty: 4 | Refills: 0 | Status: DISCONTINUED | OUTPATIENT
Start: 2022-06-16 | End: 2022-06-16

## 2022-06-16 RX ORDER — ACETAMINOPHEN 500 MG
1000 TABLET ORAL ONCE
Refills: 0 | Status: COMPLETED | OUTPATIENT
Start: 2022-06-16 | End: 2022-06-16

## 2022-06-16 RX ORDER — POLYETHYLENE GLYCOL 3350 17 G/17G
17 POWDER, FOR SOLUTION ORAL DAILY
Refills: 0 | Status: DISCONTINUED | OUTPATIENT
Start: 2022-06-17 | End: 2022-06-23

## 2022-06-16 RX ORDER — VASOPRESSIN 20 [USP'U]/ML
0.05 INJECTION INTRAVENOUS
Qty: 50 | Refills: 0 | Status: DISCONTINUED | OUTPATIENT
Start: 2022-06-16 | End: 2022-06-16

## 2022-06-16 RX ORDER — CHLORHEXIDINE GLUCONATE 213 G/1000ML
5 SOLUTION TOPICAL
Refills: 0 | Status: DISCONTINUED | OUTPATIENT
Start: 2022-06-16 | End: 2022-06-17

## 2022-06-16 RX ORDER — CHLORHEXIDINE GLUCONATE 213 G/1000ML
1 SOLUTION TOPICAL ONCE
Refills: 0 | Status: COMPLETED | OUTPATIENT
Start: 2022-06-16 | End: 2022-06-16

## 2022-06-16 RX ORDER — CALCIUM GLUCONATE 100 MG/ML
2 VIAL (ML) INTRAVENOUS ONCE
Refills: 0 | Status: COMPLETED | OUTPATIENT
Start: 2022-06-16 | End: 2022-06-16

## 2022-06-16 RX ORDER — DEXMEDETOMIDINE HYDROCHLORIDE IN 0.9% SODIUM CHLORIDE 4 UG/ML
0.4 INJECTION INTRAVENOUS
Qty: 200 | Refills: 0 | Status: DISCONTINUED | OUTPATIENT
Start: 2022-06-16 | End: 2022-06-16

## 2022-06-16 RX ORDER — OXYCODONE HYDROCHLORIDE 5 MG/1
5 TABLET ORAL EVERY 4 HOURS
Refills: 0 | Status: DISCONTINUED | OUTPATIENT
Start: 2022-06-16 | End: 2022-06-16

## 2022-06-16 RX ORDER — MEPERIDINE HYDROCHLORIDE 50 MG/ML
25 INJECTION INTRAMUSCULAR; INTRAVENOUS; SUBCUTANEOUS ONCE
Refills: 0 | Status: DISCONTINUED | OUTPATIENT
Start: 2022-06-16 | End: 2022-06-16

## 2022-06-16 RX ORDER — CHLORHEXIDINE GLUCONATE 213 G/1000ML
1 SOLUTION TOPICAL DAILY
Refills: 0 | Status: DISCONTINUED | OUTPATIENT
Start: 2022-06-16 | End: 2022-06-18

## 2022-06-16 RX ORDER — DEXTROSE 50 % IN WATER 50 %
50 SYRINGE (ML) INTRAVENOUS
Refills: 0 | Status: DISCONTINUED | OUTPATIENT
Start: 2022-06-16 | End: 2022-06-16

## 2022-06-16 RX ORDER — GABAPENTIN 400 MG/1
200 CAPSULE ORAL ONCE
Refills: 0 | Status: COMPLETED | OUTPATIENT
Start: 2022-06-16 | End: 2022-06-16

## 2022-06-16 RX ADMIN — Medication 125 MILLILITER(S): at 18:40

## 2022-06-16 RX ADMIN — Medication 5.5 MICROGRAM(S)/KG/MIN: at 15:18

## 2022-06-16 RX ADMIN — Medication 100 MILLIEQUIVALENT(S): at 17:00

## 2022-06-16 RX ADMIN — HYDROMORPHONE HYDROCHLORIDE 0.5 MILLIGRAM(S): 2 INJECTION INTRAMUSCULAR; INTRAVENOUS; SUBCUTANEOUS at 23:15

## 2022-06-16 RX ADMIN — HYDROMORPHONE HYDROCHLORIDE 0.5 MILLIGRAM(S): 2 INJECTION INTRAMUSCULAR; INTRAVENOUS; SUBCUTANEOUS at 22:00

## 2022-06-16 RX ADMIN — VASOPRESSIN 3 UNIT(S)/MIN: 20 INJECTION INTRAVENOUS at 15:19

## 2022-06-16 RX ADMIN — HYDROMORPHONE HYDROCHLORIDE 0.5 MILLIGRAM(S): 2 INJECTION INTRAMUSCULAR; INTRAVENOUS; SUBCUTANEOUS at 23:00

## 2022-06-16 RX ADMIN — HYDROMORPHONE HYDROCHLORIDE 0.5 MILLIGRAM(S): 2 INJECTION INTRAMUSCULAR; INTRAVENOUS; SUBCUTANEOUS at 16:20

## 2022-06-16 RX ADMIN — Medication 400 MILLIGRAM(S): at 17:45

## 2022-06-16 RX ADMIN — Medication 1000 MILLIGRAM(S): at 18:15

## 2022-06-16 RX ADMIN — Medication 125 MILLILITER(S): at 18:41

## 2022-06-16 RX ADMIN — Medication 100 MILLIEQUIVALENT(S): at 19:15

## 2022-06-16 RX ADMIN — HYDROMORPHONE HYDROCHLORIDE 0.5 MILLIGRAM(S): 2 INJECTION INTRAMUSCULAR; INTRAVENOUS; SUBCUTANEOUS at 17:30

## 2022-06-16 RX ADMIN — Medication 100 MILLIEQUIVALENT(S): at 20:00

## 2022-06-16 RX ADMIN — Medication 8.81 MICROGRAM(S)/KG/MIN: at 16:05

## 2022-06-16 RX ADMIN — Medication 1000 MILLIGRAM(S): at 06:17

## 2022-06-16 RX ADMIN — CHLORHEXIDINE GLUCONATE 5 MILLILITER(S): 213 SOLUTION TOPICAL at 18:06

## 2022-06-16 RX ADMIN — Medication 100 MILLIEQUIVALENT(S): at 17:16

## 2022-06-16 RX ADMIN — SODIUM CHLORIDE 3 MILLILITER(S): 9 INJECTION INTRAMUSCULAR; INTRAVENOUS; SUBCUTANEOUS at 06:13

## 2022-06-16 RX ADMIN — GABAPENTIN 200 MILLIGRAM(S): 400 CAPSULE ORAL at 06:17

## 2022-06-16 RX ADMIN — Medication 125 MILLILITER(S): at 21:25

## 2022-06-16 RX ADMIN — CHLORHEXIDINE GLUCONATE 15 MILLILITER(S): 213 SOLUTION TOPICAL at 18:05

## 2022-06-16 RX ADMIN — EPINEPHRINE 2.2 MICROGRAM(S)/KG/MIN: 0.3 INJECTION INTRAMUSCULAR; SUBCUTANEOUS at 16:05

## 2022-06-16 RX ADMIN — HYDROMORPHONE HYDROCHLORIDE 0.5 MILLIGRAM(S): 2 INJECTION INTRAMUSCULAR; INTRAVENOUS; SUBCUTANEOUS at 18:35

## 2022-06-16 RX ADMIN — CHLORHEXIDINE GLUCONATE 1 APPLICATION(S): 213 SOLUTION TOPICAL at 18:42

## 2022-06-16 RX ADMIN — HYDROMORPHONE HYDROCHLORIDE 0.5 MILLIGRAM(S): 2 INJECTION INTRAMUSCULAR; INTRAVENOUS; SUBCUTANEOUS at 22:15

## 2022-06-16 RX ADMIN — CHLORHEXIDINE GLUCONATE 1 APPLICATION(S): 213 SOLUTION TOPICAL at 06:12

## 2022-06-16 RX ADMIN — Medication 200 GRAM(S): at 21:12

## 2022-06-16 RX ADMIN — PANTOPRAZOLE SODIUM 40 MILLIGRAM(S): 20 TABLET, DELAYED RELEASE ORAL at 18:06

## 2022-06-16 RX ADMIN — HYDROMORPHONE HYDROCHLORIDE 0.5 MILLIGRAM(S): 2 INJECTION INTRAMUSCULAR; INTRAVENOUS; SUBCUTANEOUS at 17:45

## 2022-06-16 RX ADMIN — SODIUM CHLORIDE 6000 MILLILITER(S): 9 INJECTION, SOLUTION INTRAVENOUS at 15:10

## 2022-06-16 RX ADMIN — Medication 50 MILLIGRAM(S): at 16:22

## 2022-06-16 RX ADMIN — Medication 100 MILLIEQUIVALENT(S): at 16:00

## 2022-06-16 RX ADMIN — Medication 125 MILLILITER(S): at 21:00

## 2022-06-16 RX ADMIN — Medication 250 MILLIGRAM(S): at 19:44

## 2022-06-16 RX ADMIN — Medication 63.75 MILLIMOLE(S): at 17:57

## 2022-06-16 RX ADMIN — DEXMEDETOMIDINE HYDROCHLORIDE IN 0.9% SODIUM CHLORIDE 5.87 MICROGRAM(S)/KG/HR: 4 INJECTION INTRAVENOUS at 16:00

## 2022-06-16 RX ADMIN — Medication 100 MILLIEQUIVALENT(S): at 19:14

## 2022-06-16 RX ADMIN — ONDANSETRON 4 MILLIGRAM(S): 8 TABLET, FILM COATED ORAL at 20:27

## 2022-06-16 NOTE — BRIEF OPERATIVE NOTE - NSICDXBRIEFPROCEDURE_GEN_ALL_CORE_FT
PROCEDURES:  Aortic valve replacement, tissue 16-Jun-2022 15:57:55  Rolando John  CABG, with BRANDON 16-Jun-2022 15:58:05  Rolando John

## 2022-06-16 NOTE — PRE-ANESTHESIA EVALUATION ADULT - NSANTHPMHFT_GEN_ALL_CORE
This is a 70 y/o female PMH CAD, severe aortic stenosis, was found to have TVD on cardiac catheterization performed at [facility] in April, denies dyspnea, angina or syncope.  Presents today for aortic valve replacement and CABG X 3. This is a 70 y/o female PMH CAD, severe aortic stenosis, was found to have TVD on cardiac catheterization performed at [facility] in April, denies dyspnea, angina or syncope.  Presents today for aortic valve replacement and CABG X 3.    Denies active CP/SOB/Orthopnea

## 2022-06-16 NOTE — PROGRESS NOTE ADULT - SUBJECTIVE AND OBJECTIVE BOX
Patient seen and examined at the bedside.    Remained critically ill on continuous ICU monitoring.    OBJECTIVE:  Vital Signs Last 24 Hrs  T(C): 36.3 (16 Jun 2022 15:50), Max: 36.4 (16 Jun 2022 06:07)  T(F): 97.3 (16 Jun 2022 15:50), Max: 97.5 (16 Jun 2022 06:07)  HR: 104 (16 Jun 2022 17:30) (92 - 110)  BP: 169/89 (16 Jun 2022 06:43) (169/89 - 169/89)  BP(mean): --  RR: 16 (16 Jun 2022 17:30) (12 - 29)  SpO2: 100% (16 Jun 2022 17:30) (99% - 100%)    REVIEW OF SYSTEMS:  [x ] N/A    Physical Exam:  General: intubated multiple lines gtt & tubes   Neurology: sedated   Eyes: bilateral pupils equal and reactive   ENT/Neck: +ETT midline, Neck supple, trachea midline, No JVD   Respiratory: Rales noted bilaterally   CV: RRR, S1S2, no murmurs        [x] Sternal dressing, [x] Mediastinal CT x2        [x] Sinus tachycardia [x] Temporary pacing- DDI 50  Abdominal: Soft, NT, ND +BS,   Extremities: 1-2+ pedal edema noted, + peripheral pulses   Skin: No Rashes, Hematoma, Ecchymosis                           Assessment:  This is a 72 y/o female PMH HTN / CAD / severe aortic stenosis     Aortic stenosis and multivessel CAD S/P AVR and CABG x2 POD# 0  Lactic acidosis  Hyperglycemia     Plan:   ***Neuro**  [x] Sedated with [x] Precedex   Post operative neuro assessment     ***Cardiovascular***  Invasive hemodynamic monitoring, assess perfusion indices    / CVP 6/ MAP 98/ Hct 35/ Lactate 2.6  [x] Levophed 0.05 mcg weaned to off [x] Vasopressin 0.05 weaned to off   [x] Dobutamine 5mcg->3mcg [x] Epinephrine 0.01 mcg weaned to off [x] Cardene 5 mg- weaned to off  Volume: [x] Lactated ringers 1L [x] PRBC x1 [x] Cryo x5  Reassessment of hemodynamics post resuscitation   Monitor chest tube outputs   [x] Bleeding   [x] PO Amiodarone for afib prophylaxis   Serial EKG and cardiac enzymes     ***Pulmonary***  Post op vent management  Titration of FiO2 and PEEP, follow SpO2, CXR, blood gases     Mode: AC/ CMV (Assist Control/ Continuous Mandatory Ventilation)  RR (machine): 12  TV (machine): 500  FiO2: 100  PEEP: 5  ITime: 1  MAP: 8  PIP: 15              Will plan to wean & extubate once pt is hemodynamically stable and not bleeding    ***GI***  [x] NPO  [x] Protonix      ***Renal***  Continue to monitor I/Os, BUN/Creatinine.   Replete lytes PRN  Barber present [x] positive     ***ID***  Perioperative antibiotics Vancomycin and aztreonam    ***Endocrine***  [x] Hyperglycemia : HbA1c 6.0%               - [x] Insulin gtt             - Need tight glycemic control to prevent wound infection.      Patient requires continuous monitoring with bedside rhythm monitoring, pulse oximetry monitoring, and continuous central venous and arterial pressure monitoring; and intermittent blood gas analysis. Care plan discussed with the ICU care team.   Patient remained critical, at risk for life threatening decompensation.    I have spent 30 minutes providing critical care management to this patient.    By signing my name below, I, Julita Wise, attest that this documentation has been prepared under the direction and in the presence of Juan Taylor MD   Electronically signed: Patrick Raya, 06-16-22 @ 17:42    I, Juan Taylor, personally performed the services described in this documentation. all medical record entries made by the scribe were at my direction and in my presence. I have reviewed the chart and agree that the record reflects my personal performance and is accurate and complete  Electronically signed: Juan Taylor MD      Patient seen and examined at the bedside.    Remained critically ill on continuous ICU monitoring.    OBJECTIVE:  Vital Signs Last 24 Hrs  T(C): 36.3 (16 Jun 2022 15:50), Max: 36.4 (16 Jun 2022 06:07)  T(F): 97.3 (16 Jun 2022 15:50), Max: 97.5 (16 Jun 2022 06:07)  HR: 104 (16 Jun 2022 17:30) (92 - 110)  BP: 169/89 (16 Jun 2022 06:43) (169/89 - 169/89)  BP(mean): --  RR: 16 (16 Jun 2022 17:30) (12 - 29)  SpO2: 100% (16 Jun 2022 17:30) (99% - 100%)    REVIEW OF SYSTEMS:  [x ] N/A    Physical Exam:  General: intubated multiple lines gtt & tubes   Neurology: sedated   Eyes: bilateral pupils equal and reactive   ENT/Neck: +ETT midline, Neck supple, trachea midline, No JVD   Respiratory: Rales noted bilaterally   CV: RRR, S1S2, no murmurs        [x] Sternal dressing, [x] Mediastinal CT x2        [x] Sinus tachycardia [x] Temporary pacing- DDI 50  Abdominal: Soft, NT, ND +BS,   Extremities: 1-2+ pedal edema noted, + peripheral pulses   Skin: No Rashes, Hematoma, Ecchymosis                           Assessment:  71 yr old  HTN / HLD / CAD / severe AS      S/P AVR (T)  CABG x2 POD# 0  Intra operative RV dysfunction requiring dual inotropic support / bleeding requiring platelets & Cryo   Multifactorial shock related to cardiac dysfunction & hypovolemia / lactic acidosis   Acute blood loss Anemia / thrombocytopenia / coagulopathy   Hyperglycemia / Hypokalemia / Hypophosphatemia     Plan:   ***Neuro**  [x] Sedated with [x] Precedex   Post operative neuro assessment     ***Cardiovascular***  Invasive hemodynamic monitoring, assess perfusion indices   ST 8-10 / CVP 6 / MAP 60-90 / Hct 35/ Lactate 4.6 / ScVO2 70 /   [x] Levophed 0.05 mcg weaned to off [x] Vasopressin 0.05 weaned to off   [x] Dobutamine 5mcg->3mcg [x] Epinephrine 0.01 mcg weaned to off [x] Cardene 5 mg- weaned to off  Volume: [x] Lactated ringers 2000 cc [x] Albumin 500 cc  [x] PRBC x1 [x] Cryo x5    Monitor chest tube outputs  Platelets count / thrombocytopenia related to CBP / continue to monitor repeat Coag / CBC & fibrinogen   [x] minimal Bleeding   [x]  Amiodarone for afib prophylaxis   Replete Phos / K repeat level   Serial EKG and cardiac enzymes     ***Pulmonary***  Post op vent management  Titration of FiO2 and PEEP, follow SpO2, CXR, blood gases     Mode: AC/ CMV (Assist Control/ Continuous Mandatory Ventilation)  RR (machine): 12  TV (machine): 500  FiO2: 100  PEEP: 5  ITime: 1  MAP: 8  PIP: 15              Will plan to wean & extubate once pt is hemodynamically stable and not bleeding    ***GI***  [x] NPO  [x] Protonix      ***Renal***  Continue to monitor I/Os, BUN/Creatinine.   Replete lytes PRN  Barber present [x] positive     ***ID***  Perioperative antibiotics Vancomycin and aztreonam    ***Endocrine***  [x] Hyperglycemia : HbA1c 6.0%               - [x] Insulin gtt             - Need tight glycemic control to prevent wound infection.      Patient requires continuous monitoring with bedside rhythm monitoring, pulse oximetry monitoring, and continuous central venous and arterial pressure monitoring; and intermittent blood gas analysis. Care plan discussed with the ICU care team.   Patient remained critical, at risk for life threatening decompensation.    I have spent 30 minutes providing critical care management to this patient.    By signing my name below, I, Julita Wise, attest that this documentation has been prepared under the direction and in the presence of Juan Taylor MD   Electronically signed: Patrick Raya, 06-16-22 @ 17:42    I, Juan Taylor, personally performed the services described in this documentation. all medical record entries made by the maruibdayton were at my direction and in my presence. I have reviewed the chart and agree that the record reflects my personal performance and is accurate and complete  Electronically signed: Juan Taylor MD

## 2022-06-16 NOTE — PATIENT PROFILE ADULT - FALL HARM RISK - UNIVERSAL INTERVENTIONS
Bed in lowest position, wheels locked, appropriate side rails in place/Call bell, personal items and telephone in reach/Instruct patient to call for assistance before getting out of bed or chair/Non-slip footwear when patient is out of bed/[place] to call system/Physically safe environment - no spills, clutter or unnecessary equipment/Purposeful Proactive Rounding/Room/bathroom lighting operational, light cord in reach

## 2022-06-16 NOTE — BRIEF OPERATIVE NOTE - NSICDXBRIEFPREOP_GEN_ALL_CORE_FT
PRE-OP DIAGNOSIS:  Aortic stenosis 16-Jun-2022 15:58:09  Rolando John  CAD (coronary artery disease) 16-Jun-2022 15:58:14  Rolando John

## 2022-06-16 NOTE — BRIEF OPERATIVE NOTE - NSICDXBRIEFPOSTOP_GEN_ALL_CORE_FT
POST-OP DIAGNOSIS:  Aortic stenosis 16-Jun-2022 15:58:17  Rolando John  CAD (coronary artery disease) 16-Jun-2022 15:58:22  Rolando John

## 2022-06-17 LAB
ALBUMIN SERPL ELPH-MCNC: 4.1 G/DL — SIGNIFICANT CHANGE UP (ref 3.3–5)
ALP SERPL-CCNC: 27 U/L — LOW (ref 40–120)
ALT FLD-CCNC: 13 U/L — SIGNIFICANT CHANGE UP (ref 10–45)
ANION GAP SERPL CALC-SCNC: 12 MMOL/L — SIGNIFICANT CHANGE UP (ref 5–17)
APTT BLD: 26.2 SEC — LOW (ref 27.5–35.5)
APTT BLD: 29.2 SEC — SIGNIFICANT CHANGE UP (ref 27.5–35.5)
APTT BLD: 33.3 SEC — SIGNIFICANT CHANGE UP (ref 27.5–35.5)
AST SERPL-CCNC: 43 U/L — HIGH (ref 10–40)
BASE EXCESS BLDMV CALC-SCNC: -0.2 MMOL/L — SIGNIFICANT CHANGE UP (ref -3–3)
BASE EXCESS BLDMV CALC-SCNC: -1.1 MMOL/L — SIGNIFICANT CHANGE UP (ref -3–3)
BASOPHILS # BLD AUTO: 0.01 K/UL — SIGNIFICANT CHANGE UP (ref 0–0.2)
BASOPHILS NFR BLD AUTO: 0.2 % — SIGNIFICANT CHANGE UP (ref 0–2)
BILIRUB SERPL-MCNC: 1.5 MG/DL — HIGH (ref 0.2–1.2)
BUN SERPL-MCNC: 11 MG/DL — SIGNIFICANT CHANGE UP (ref 7–23)
CALCIUM SERPL-MCNC: 7.8 MG/DL — LOW (ref 8.4–10.5)
CHLORIDE SERPL-SCNC: 114 MMOL/L — HIGH (ref 96–108)
CO2 BLDMV-SCNC: 28 MMOL/L — SIGNIFICANT CHANGE UP (ref 21–29)
CO2 BLDMV-SCNC: 28 MMOL/L — SIGNIFICANT CHANGE UP (ref 21–29)
CO2 SERPL-SCNC: 24 MMOL/L — SIGNIFICANT CHANGE UP (ref 22–31)
CREAT SERPL-MCNC: 0.71 MG/DL — SIGNIFICANT CHANGE UP (ref 0.5–1.3)
EGFR: 90 ML/MIN/1.73M2 — SIGNIFICANT CHANGE UP
EOSINOPHIL # BLD AUTO: 0 K/UL — SIGNIFICANT CHANGE UP (ref 0–0.5)
EOSINOPHIL NFR BLD AUTO: 0 % — SIGNIFICANT CHANGE UP (ref 0–6)
FIBRINOGEN PPP-MCNC: 451 MG/DL — SIGNIFICANT CHANGE UP (ref 330–520)
GAS PNL BLDA: SIGNIFICANT CHANGE UP
GAS PNL BLDMV: SIGNIFICANT CHANGE UP
GAS PNL BLDMV: SIGNIFICANT CHANGE UP
GLUCOSE SERPL-MCNC: 113 MG/DL — HIGH (ref 70–99)
HCO3 BLDMV-SCNC: 26 MMOL/L — SIGNIFICANT CHANGE UP (ref 20–28)
HCO3 BLDMV-SCNC: 26 MMOL/L — SIGNIFICANT CHANGE UP (ref 20–28)
HCT VFR BLD CALC: 23.1 % — LOW (ref 34.5–45)
HCT VFR BLD CALC: 24.8 % — LOW (ref 34.5–45)
HEPARIN-PF4 AB RESULT: SIGNIFICANT CHANGE UP (ref 0–0.9)
HGB BLD-MCNC: 7.7 G/DL — LOW (ref 11.5–15.5)
HGB BLD-MCNC: 8.2 G/DL — LOW (ref 11.5–15.5)
HOROWITZ INDEX BLDMV+IHG-RTO: 40 — SIGNIFICANT CHANGE UP
HOROWITZ INDEX BLDMV+IHG-RTO: 40 — SIGNIFICANT CHANGE UP
IMM GRANULOCYTES NFR BLD AUTO: 0.4 % — SIGNIFICANT CHANGE UP (ref 0–1.5)
INR BLD: 1.36 RATIO — HIGH (ref 0.88–1.16)
INR BLD: 1.4 RATIO — HIGH (ref 0.88–1.16)
INR BLD: 1.68 RATIO — HIGH (ref 0.88–1.16)
LYMPHOCYTES # BLD AUTO: 0.36 K/UL — LOW (ref 1–3.3)
LYMPHOCYTES # BLD AUTO: 7.2 % — LOW (ref 13–44)
MAGNESIUM SERPL-MCNC: 2.3 MG/DL — SIGNIFICANT CHANGE UP (ref 1.6–2.6)
MCHC RBC-ENTMCNC: 30 PG — SIGNIFICANT CHANGE UP (ref 27–34)
MCHC RBC-ENTMCNC: 30.3 PG — SIGNIFICANT CHANGE UP (ref 27–34)
MCHC RBC-ENTMCNC: 33.1 GM/DL — SIGNIFICANT CHANGE UP (ref 32–36)
MCHC RBC-ENTMCNC: 33.3 GM/DL — SIGNIFICANT CHANGE UP (ref 32–36)
MCV RBC AUTO: 90.8 FL — SIGNIFICANT CHANGE UP (ref 80–100)
MCV RBC AUTO: 90.9 FL — SIGNIFICANT CHANGE UP (ref 80–100)
MONOCYTES # BLD AUTO: 0.27 K/UL — SIGNIFICANT CHANGE UP (ref 0–0.9)
MONOCYTES NFR BLD AUTO: 5.4 % — SIGNIFICANT CHANGE UP (ref 2–14)
NEUTROPHILS # BLD AUTO: 4.35 K/UL — SIGNIFICANT CHANGE UP (ref 1.8–7.4)
NEUTROPHILS NFR BLD AUTO: 86.8 % — HIGH (ref 43–77)
NRBC # BLD: 0 /100 WBCS — SIGNIFICANT CHANGE UP (ref 0–0)
NRBC # BLD: 0 /100 WBCS — SIGNIFICANT CHANGE UP (ref 0–0)
O2 CT VFR BLD CALC: 38 MMHG — SIGNIFICANT CHANGE UP (ref 30–65)
O2 CT VFR BLD CALC: 38 MMHG — SIGNIFICANT CHANGE UP (ref 30–65)
PCO2 BLDMV: 50 MMHG — SIGNIFICANT CHANGE UP (ref 30–65)
PCO2 BLDMV: 53 MMHG — SIGNIFICANT CHANGE UP (ref 30–65)
PF4 HEPARIN CMPLX AB SER-ACNC: NEGATIVE — SIGNIFICANT CHANGE UP
PH BLDMV: 7.3 — LOW (ref 7.32–7.45)
PH BLDMV: 7.33 — SIGNIFICANT CHANGE UP (ref 7.32–7.45)
PHOSPHATE SERPL-MCNC: 3.8 MG/DL — SIGNIFICANT CHANGE UP (ref 2.5–4.5)
PLATELET # BLD AUTO: 117 K/UL — LOW (ref 150–400)
PLATELET # BLD AUTO: 88 K/UL — LOW (ref 150–400)
POTASSIUM SERPL-MCNC: 3.9 MMOL/L — SIGNIFICANT CHANGE UP (ref 3.5–5.3)
POTASSIUM SERPL-SCNC: 3.9 MMOL/L — SIGNIFICANT CHANGE UP (ref 3.5–5.3)
PROT SERPL-MCNC: 5.2 G/DL — LOW (ref 6–8.3)
PROTHROM AB SERPL-ACNC: 15.8 SEC — HIGH (ref 10.5–13.4)
PROTHROM AB SERPL-ACNC: 16.1 SEC — HIGH (ref 10.5–13.4)
PROTHROM AB SERPL-ACNC: 19.4 SEC — HIGH (ref 10.5–13.4)
RBC # BLD: 2.54 M/UL — LOW (ref 3.8–5.2)
RBC # BLD: 2.73 M/UL — LOW (ref 3.8–5.2)
RBC # FLD: 14.8 % — HIGH (ref 10.3–14.5)
RBC # FLD: 15.6 % — HIGH (ref 10.3–14.5)
SAO2 % BLDMV: 61.9 — SIGNIFICANT CHANGE UP (ref 60–90)
SAO2 % BLDMV: 62.1 — SIGNIFICANT CHANGE UP (ref 60–90)
SODIUM SERPL-SCNC: 150 MMOL/L — HIGH (ref 135–145)
TSH SERPL-MCNC: 2.59 UIU/ML — SIGNIFICANT CHANGE UP (ref 0.27–4.2)
TSH SERPL-MCNC: 2.76 UIU/ML — SIGNIFICANT CHANGE UP (ref 0.27–4.2)
WBC # BLD: 5.01 K/UL — SIGNIFICANT CHANGE UP (ref 3.8–10.5)
WBC # BLD: 8.79 K/UL — SIGNIFICANT CHANGE UP (ref 3.8–10.5)
WBC # FLD AUTO: 5.01 K/UL — SIGNIFICANT CHANGE UP (ref 3.8–10.5)
WBC # FLD AUTO: 8.79 K/UL — SIGNIFICANT CHANGE UP (ref 3.8–10.5)

## 2022-06-17 PROCEDURE — 93010 ELECTROCARDIOGRAM REPORT: CPT

## 2022-06-17 PROCEDURE — 99291 CRITICAL CARE FIRST HOUR: CPT

## 2022-06-17 PROCEDURE — 71045 X-RAY EXAM CHEST 1 VIEW: CPT | Mod: 26

## 2022-06-17 RX ORDER — PANTOPRAZOLE SODIUM 20 MG/1
40 TABLET, DELAYED RELEASE ORAL
Refills: 0 | Status: DISCONTINUED | OUTPATIENT
Start: 2022-06-18 | End: 2022-06-23

## 2022-06-17 RX ORDER — FUROSEMIDE 40 MG
10 TABLET ORAL ONCE
Refills: 0 | Status: COMPLETED | OUTPATIENT
Start: 2022-06-17 | End: 2022-06-17

## 2022-06-17 RX ORDER — KETOROLAC TROMETHAMINE 30 MG/ML
30 SYRINGE (ML) INJECTION ONCE
Refills: 0 | Status: DISCONTINUED | OUTPATIENT
Start: 2022-06-17 | End: 2022-06-17

## 2022-06-17 RX ORDER — ONDANSETRON 8 MG/1
4 TABLET, FILM COATED ORAL EVERY 8 HOURS
Refills: 0 | Status: DISCONTINUED | OUTPATIENT
Start: 2022-06-17 | End: 2022-06-20

## 2022-06-17 RX ORDER — METOPROLOL TARTRATE 50 MG
12.5 TABLET ORAL EVERY 12 HOURS
Refills: 0 | Status: DISCONTINUED | OUTPATIENT
Start: 2022-06-17 | End: 2022-06-17

## 2022-06-17 RX ORDER — FUROSEMIDE 40 MG
20 TABLET ORAL ONCE
Refills: 0 | Status: COMPLETED | OUTPATIENT
Start: 2022-06-17 | End: 2022-06-17

## 2022-06-17 RX ORDER — ENOXAPARIN SODIUM 100 MG/ML
40 INJECTION SUBCUTANEOUS EVERY 24 HOURS
Refills: 0 | Status: DISCONTINUED | OUTPATIENT
Start: 2022-06-18 | End: 2022-06-23

## 2022-06-17 RX ORDER — METOPROLOL TARTRATE 50 MG
25 TABLET ORAL EVERY 8 HOURS
Refills: 0 | Status: DISCONTINUED | OUTPATIENT
Start: 2022-06-17 | End: 2022-06-19

## 2022-06-17 RX ORDER — ASPIRIN/CALCIUM CARB/MAGNESIUM 324 MG
81 TABLET ORAL DAILY
Refills: 0 | Status: DISCONTINUED | OUTPATIENT
Start: 2022-06-17 | End: 2022-06-23

## 2022-06-17 RX ORDER — CALCIUM GLUCONATE 100 MG/ML
1 VIAL (ML) INTRAVENOUS ONCE
Refills: 0 | Status: COMPLETED | OUTPATIENT
Start: 2022-06-17 | End: 2022-06-17

## 2022-06-17 RX ADMIN — Medication 650 MILLIGRAM(S): at 11:37

## 2022-06-17 RX ADMIN — CHLORHEXIDINE GLUCONATE 1 APPLICATION(S): 213 SOLUTION TOPICAL at 11:37

## 2022-06-17 RX ADMIN — POLYETHYLENE GLYCOL 3350 17 GRAM(S): 17 POWDER, FOR SOLUTION ORAL at 11:37

## 2022-06-17 RX ADMIN — SENNA PLUS 2 TABLET(S): 8.6 TABLET ORAL at 21:58

## 2022-06-17 RX ADMIN — Medication 50 MILLIGRAM(S): at 00:53

## 2022-06-17 RX ADMIN — Medication 30 MILLIGRAM(S): at 06:30

## 2022-06-17 RX ADMIN — Medication 81 MILLIGRAM(S): at 17:38

## 2022-06-17 RX ADMIN — ATORVASTATIN CALCIUM 80 MILLIGRAM(S): 80 TABLET, FILM COATED ORAL at 21:58

## 2022-06-17 RX ADMIN — Medication 650 MILLIGRAM(S): at 17:45

## 2022-06-17 RX ADMIN — GABAPENTIN 100 MILLIGRAM(S): 400 CAPSULE ORAL at 21:58

## 2022-06-17 RX ADMIN — Medication 25 MILLIGRAM(S): at 21:58

## 2022-06-17 RX ADMIN — HYDROMORPHONE HYDROCHLORIDE 0.5 MILLIGRAM(S): 2 INJECTION INTRAMUSCULAR; INTRAVENOUS; SUBCUTANEOUS at 06:30

## 2022-06-17 RX ADMIN — ONDANSETRON 4 MILLIGRAM(S): 8 TABLET, FILM COATED ORAL at 21:30

## 2022-06-17 RX ADMIN — Medication 500 MILLIGRAM(S): at 17:35

## 2022-06-17 RX ADMIN — Medication 650 MILLIGRAM(S): at 06:30

## 2022-06-17 RX ADMIN — Medication 20 MILLIGRAM(S): at 04:55

## 2022-06-17 RX ADMIN — GABAPENTIN 100 MILLIGRAM(S): 400 CAPSULE ORAL at 06:14

## 2022-06-17 RX ADMIN — Medication 250 MILLIGRAM(S): at 09:00

## 2022-06-17 RX ADMIN — Medication 500 MILLIGRAM(S): at 06:14

## 2022-06-17 RX ADMIN — ONDANSETRON 4 MILLIGRAM(S): 8 TABLET, FILM COATED ORAL at 12:05

## 2022-06-17 RX ADMIN — Medication 50 MILLIGRAM(S): at 17:38

## 2022-06-17 RX ADMIN — Medication 10 MILLIGRAM(S): at 17:36

## 2022-06-17 RX ADMIN — Medication 650 MILLIGRAM(S): at 06:14

## 2022-06-17 RX ADMIN — Medication 100 GRAM(S): at 02:27

## 2022-06-17 RX ADMIN — Medication 50 MILLIGRAM(S): at 11:00

## 2022-06-17 RX ADMIN — Medication 650 MILLIGRAM(S): at 17:38

## 2022-06-17 RX ADMIN — GABAPENTIN 100 MILLIGRAM(S): 400 CAPSULE ORAL at 13:02

## 2022-06-17 RX ADMIN — PANTOPRAZOLE SODIUM 40 MILLIGRAM(S): 20 TABLET, DELAYED RELEASE ORAL at 11:36

## 2022-06-17 RX ADMIN — Medication 12.5 MILLIGRAM(S): at 13:32

## 2022-06-17 RX ADMIN — Medication 250 MILLIGRAM(S): at 20:20

## 2022-06-17 RX ADMIN — HYDROMORPHONE HYDROCHLORIDE 0.5 MILLIGRAM(S): 2 INJECTION INTRAMUSCULAR; INTRAVENOUS; SUBCUTANEOUS at 06:14

## 2022-06-17 RX ADMIN — Medication 30 MILLIGRAM(S): at 06:15

## 2022-06-17 RX ADMIN — AMIODARONE HYDROCHLORIDE 400 MILLIGRAM(S): 400 TABLET ORAL at 06:20

## 2022-06-17 RX ADMIN — AMIODARONE HYDROCHLORIDE 400 MILLIGRAM(S): 400 TABLET ORAL at 17:35

## 2022-06-17 NOTE — PROGRESS NOTE ADULT - SUBJECTIVE AND OBJECTIVE BOX
CRITICAL CARE ATTENDING - CTICU    MEDICATIONS  (STANDING):  acetaminophen     Tablet .. 650 milliGRAM(s) Oral every 6 hours  aMIOdarone    Tablet 400 milliGRAM(s) Oral two times a day  ascorbic acid 500 milliGRAM(s) Oral two times a day  atorvastatin 80 milliGRAM(s) Oral at bedtime  aztreonam  IVPB 1000 milliGRAM(s) IV Intermittent every 8 hours  chlorhexidine 2% Cloths 1 Application(s) Topical daily  chlorhexidine 4% Liquid 1 Application(s) Topical daily  DOBUTamine Infusion 2.5 MICROgram(s)/kG/Min (4.4 mL/Hr) IV Continuous <Continuous>  gabapentin 100 milliGRAM(s) Oral every 8 hours  insulin regular Infusion 3 Unit(s)/Hr (3 mL/Hr) IV Continuous <Continuous>  ketorolac   Injectable 30 milliGRAM(s) IV Push once  pantoprazole  Injectable 40 milliGRAM(s) IV Push daily  polyethylene glycol 3350 17 Gram(s) Oral daily  senna 2 Tablet(s) Oral at bedtime  sodium chloride 0.9%. 1000 milliLiter(s) (10 mL/Hr) IV Continuous <Continuous>  vancomycin  IVPB 750 milliGRAM(s) IV Intermittent every 12 hours                          7.7    5.01  )-----------( 88       ( 17 Jun 2022 03:25 )             23.1       06-17    150<H>  |  114<H>  |  11  ----------------------------<  113<H>  3.9   |  24  |  0.71    Ca    7.8<L>      17 Jun 2022 03:25  Phos  3.8     06-17  Mg     2.3     06-17    TPro  5.2<L>  /  Alb  4.1  /  TBili  1.5<H>  /  DBili  x   /  AST  43<H>  /  ALT  13  /  AlkPhos  27<L>  06-17      PT/INR - ( 17 Jun 2022 03:24 )   PT: 16.1 sec;   INR: 1.40 ratio         PTT - ( 17 Jun 2022 03:24 )  PTT:29.2 sec    Mode: off      Daily Height in cm: 154.94 (16 Jun 2022 06:43)    Daily       06-16 @ 07:01  -  06-17 @ 06:10  --------------------------------------------------------  IN: 5433 mL / OUT: 5700 mL / NET: -267 mL        Critically Ill patient  : [ ] preoperative ,   [x] post operative    Requires :  [x] Arterial Line   [x] Central Line  [ ] PA catheter  [ ] IABP  [ ] ECMO  [ ] LVAD  [ ] Ventilator  [x] pacemaker -TPM [ ] Impella.                      [x ] ABG's     [ x] Pulse Oxymetry Monitoring  Bedside evaluation , monitoring , treatment of hemodynamics , fluids , IVP/ IVCD meds.        Diagnosis:     POD 1 - C2V, AVR (t)     Severe AS     CAD     Requires chest PT, pulmonary toilet, suctioning to maintain SaO2,  patent airway and treat atelectasis.     CHF- acute [ x]   chronic [ x]    systolic [ x]   diatolic [ ]          - Echo- EF -   60%          [ ] RV dysfunction          - Cxr-cardiomegally, edema          - Clinical-  [x ]inotropes   [ ]pressors   [ ]diuresis   [ ]IABP   [ ]ECMO   [ ]LVAD   [ ]Respiratory Failure.     Temporary pacemaker (TPM) interrogation and setting.     Requires  [ ]DDD  [x ]VVI    [x]AII  temporary pacing at   50   to maintain HR, MAP, CI, and perfusion.     Hypovolemia     Hypokalemia     IVCD insulin     Thrombocytopenia     Requires bedside physical therapy, mobilization and total senior care care.           By signing my name below, I, Dianna Heard, attest that this documentation has been prepared under the direction and in the presence of Len Mann MD.   Electronically Signed: Patrick Nino 06-17-22 @ 06:10      Discussed with CT surgeon, Physician Assistant - Nurse Practitioner- Critical care medicine team.   Discussed at  AM / PM rounds.   Chart, labs , films reviewed.    Cumulative Critical Care Time Given Today:  CRITICAL CARE ATTENDING - CTICU    MEDICATIONS  (STANDING):  acetaminophen     Tablet .. 650 milliGRAM(s) Oral every 6 hours  aMIOdarone    Tablet 400 milliGRAM(s) Oral two times a day  ascorbic acid 500 milliGRAM(s) Oral two times a day  atorvastatin 80 milliGRAM(s) Oral at bedtime  aztreonam  IVPB 1000 milliGRAM(s) IV Intermittent every 8 hours  chlorhexidine 2% Cloths 1 Application(s) Topical daily  chlorhexidine 4% Liquid 1 Application(s) Topical daily  DOBUTamine Infusion 2.5 MICROgram(s)/kG/Min (4.4 mL/Hr) IV Continuous <Continuous>  gabapentin 100 milliGRAM(s) Oral every 8 hours  insulin regular Infusion 3 Unit(s)/Hr (3 mL/Hr) IV Continuous <Continuous>  ketorolac   Injectable 30 milliGRAM(s) IV Push once  pantoprazole  Injectable 40 milliGRAM(s) IV Push daily  polyethylene glycol 3350 17 Gram(s) Oral daily  senna 2 Tablet(s) Oral at bedtime  sodium chloride 0.9%. 1000 milliLiter(s) (10 mL/Hr) IV Continuous <Continuous>  vancomycin  IVPB 750 milliGRAM(s) IV Intermittent every 12 hours                          7.7    5.01  )-----------( 88       ( 17 Jun 2022 03:25 )             23.1       06-17    150<H>  |  114<H>  |  11  ----------------------------<  113<H>  3.9   |  24  |  0.71    Ca    7.8<L>      17 Jun 2022 03:25  Phos  3.8     06-17  Mg     2.3     06-17    TPro  5.2<L>  /  Alb  4.1  /  TBili  1.5<H>  /  DBili  x   /  AST  43<H>  /  ALT  13  /  AlkPhos  27<L>  06-17      PT/INR - ( 17 Jun 2022 03:24 )   PT: 16.1 sec;   INR: 1.40 ratio         PTT - ( 17 Jun 2022 03:24 )  PTT:29.2 sec    Mode: off      Daily Height in cm: 154.94 (16 Jun 2022 06:43)    Daily       06-16 @ 07:01  -  06-17 @ 06:10  --------------------------------------------------------  IN: 5433 mL / OUT: 5700 mL / NET: -267 mL        Critically Ill patient  : [ ] preoperative ,   [x] post operative    Requires :  [x] Arterial Line   [x] Central Line  [ ] PA catheter  [ ] IABP  [ ] ECMO  [ ] LVAD  [ ] Ventilator  [x] pacemaker -TPM [ ] Impella.                      [x ] ABG's     [ x] Pulse Oxymetry Monitoring  Bedside evaluation , monitoring , treatment of hemodynamics , fluids , IVP/ IVCD meds.        Diagnosis:     POD 1 - C2V, AVR (t) / Aortic Root Enlargement    Severe AS     CAD     Requires chest PT, pulmonary toilet, suctioning to maintain SaO2,  patent airway and treat atelectasis.     CHF- acute [ x]   chronic [ x]    systolic [ x]   diatolic [ x]          - Echo- EF -   60% / AS          [x ] RV dysfunction          - Cxr-cardiomegally, edema          - Clinical-  [x ]inotropes   [ ]pressors   [x ]diuresis   [ ]IABP   [ ]ECMO   [ ]LVAD   [ ]Respiratory Failure.     Hemodynamic lability,  instability. Requires IVCD [ x] vasopressors - weaned off  [ x] inotropes  [ ] vasodilator  [ ]IVSS fluid  to maintain MAP, perfusion, C.I.     Temporary pacemaker (TPM) interrogation and setting.     Hypovolemia     Hypokalemia     IVCD insulin     Thrombocytopenia     Hypernatremia     Requires bedside physical therapy, mobilization and total care home care.           By signing my name below, I, Dianna Heard, attest that this documentation has been prepared under the direction and in the presence of Len Mann MD.   Electronically Signed: Patrick Nino 06-17-22 @ 06:10    I, Len Mann, personally performed the services described in this documentation. All medical record entries made by the scribe were at my direction and in my presence. I have reviewed the chart and agree that the record reflects my personal performance and is accurate and complete.   Len Mann MD.       Discussed with CT surgeon, Physician Assistant - Nurse Practitioner- Critical care medicine team.   Discussed at  AM / PM rounds.   Chart, labs , films reviewed.    Cumulative Critical Care Time Given Today:  30 min

## 2022-06-17 NOTE — PHYSICAL THERAPY INITIAL EVALUATION ADULT - ADDITIONAL COMMENTS
Pt lives with spouse in private home, 4 steps to enter, split level stairs inside room. Prior to admission pt was independent with all functional mobility and did not use an AD to ambulate.

## 2022-06-17 NOTE — PHYSICAL THERAPY INITIAL EVALUATION ADULT - GAIT DISTANCE, PT EVAL
Called patient. She states she has not had any improvement with the antibiotics. Changed appointment up to this week. Patient verbalized understanding and thanked me. chair follow/25 feet

## 2022-06-17 NOTE — PHYSICAL THERAPY INITIAL EVALUATION ADULT - PERTINENT HX OF CURRENT PROBLEM, REHAB EVAL
70 y/o female PMH CAD, severe aortic stenosis, was found to have TVD on cardiac catheterization performed at [facility] in April, denies dyspnea, angina or syncope.  Presents today for aortic valve replacement and CABG X 3.

## 2022-06-17 NOTE — OCCUPATIONAL THERAPY INITIAL EVALUATION ADULT - GENERAL OBSERVATIONS, REHAB EVAL
Pt received seated in bedside chair, +ICU monitoring, +chest tube, +luevano, +external pacing, +dipika, +R IJ, +o2 via NC

## 2022-06-17 NOTE — PHYSICAL THERAPY INITIAL EVALUATION ADULT - GENERAL OBSERVATIONS, REHAB EVAL
pt received seated in chair s/p AVR/CABG on 6/16/22 +ICU monitoring, a-line, central line, chest tube, luevano catheter, external pacer, 5L O2 NC.

## 2022-06-17 NOTE — AIRWAY REMOVAL NOTE  ADULT & PEDS - ARTIFICAL AIRWAY REMOVAL COMMENTS
Written order for extubation verified. The patient was identified by full name and birth date compared to the identification band. Present during the procedure was PRINCESS Chinchilla

## 2022-06-17 NOTE — OCCUPATIONAL THERAPY INITIAL EVALUATION ADULT - MD ORDER
Detail Level: Simple
Detail Level: Generalized
Detail Level: Zone
OT Initial Evaluation   Ambulate with Assistance

## 2022-06-17 NOTE — OCCUPATIONAL THERAPY INITIAL EVALUATION ADULT - PERTINENT HX OF CURRENT PROBLEM, REHAB EVAL
70 y/o female PMH CAD, severe aortic stenosis, was found to have TVD on cardiac catheterization performed at [facility] in April, denies dyspnea, angina or syncope.  Presents today for aortic valve replacement and CABG X 3.   See below

## 2022-06-18 DIAGNOSIS — I10 ESSENTIAL (PRIMARY) HYPERTENSION: ICD-10-CM

## 2022-06-18 DIAGNOSIS — Z95.1 PRESENCE OF AORTOCORONARY BYPASS GRAFT: ICD-10-CM

## 2022-06-18 DIAGNOSIS — Z95.2 PRESENCE OF PROSTHETIC HEART VALVE: ICD-10-CM

## 2022-06-18 LAB
ALBUMIN SERPL ELPH-MCNC: 4.2 G/DL — SIGNIFICANT CHANGE UP (ref 3.3–5)
ALP SERPL-CCNC: 60 U/L — SIGNIFICANT CHANGE UP (ref 40–120)
ALT FLD-CCNC: 37 U/L — SIGNIFICANT CHANGE UP (ref 10–45)
ANION GAP SERPL CALC-SCNC: 12 MMOL/L — SIGNIFICANT CHANGE UP (ref 5–17)
AST SERPL-CCNC: 64 U/L — HIGH (ref 10–40)
BILIRUB SERPL-MCNC: 1.3 MG/DL — HIGH (ref 0.2–1.2)
BUN SERPL-MCNC: 22 MG/DL — SIGNIFICANT CHANGE UP (ref 7–23)
CALCIUM SERPL-MCNC: 8.1 MG/DL — LOW (ref 8.4–10.5)
CHLORIDE SERPL-SCNC: 108 MMOL/L — SIGNIFICANT CHANGE UP (ref 96–108)
CO2 SERPL-SCNC: 23 MMOL/L — SIGNIFICANT CHANGE UP (ref 22–31)
CREAT SERPL-MCNC: 0.89 MG/DL — SIGNIFICANT CHANGE UP (ref 0.5–1.3)
EGFR: 68 ML/MIN/1.73M2 — SIGNIFICANT CHANGE UP
GLUCOSE SERPL-MCNC: 142 MG/DL — HIGH (ref 70–99)
HCT VFR BLD CALC: 25.8 % — LOW (ref 34.5–45)
HGB BLD-MCNC: 8.6 G/DL — LOW (ref 11.5–15.5)
MAGNESIUM SERPL-MCNC: 2.3 MG/DL — SIGNIFICANT CHANGE UP (ref 1.6–2.6)
MCHC RBC-ENTMCNC: 30.8 PG — SIGNIFICANT CHANGE UP (ref 27–34)
MCHC RBC-ENTMCNC: 33.3 GM/DL — SIGNIFICANT CHANGE UP (ref 32–36)
MCV RBC AUTO: 92.5 FL — SIGNIFICANT CHANGE UP (ref 80–100)
NRBC # BLD: 0 /100 WBCS — SIGNIFICANT CHANGE UP (ref 0–0)
PHOSPHATE SERPL-MCNC: 4.4 MG/DL — SIGNIFICANT CHANGE UP (ref 2.5–4.5)
PLATELET # BLD AUTO: 107 K/UL — LOW (ref 150–400)
POTASSIUM SERPL-MCNC: 4 MMOL/L — SIGNIFICANT CHANGE UP (ref 3.5–5.3)
POTASSIUM SERPL-SCNC: 4 MMOL/L — SIGNIFICANT CHANGE UP (ref 3.5–5.3)
PROT SERPL-MCNC: 5.7 G/DL — LOW (ref 6–8.3)
RBC # BLD: 2.79 M/UL — LOW (ref 3.8–5.2)
RBC # FLD: 15.7 % — HIGH (ref 10.3–14.5)
SODIUM SERPL-SCNC: 143 MMOL/L — SIGNIFICANT CHANGE UP (ref 135–145)
WBC # BLD: 8.89 K/UL — SIGNIFICANT CHANGE UP (ref 3.8–10.5)
WBC # FLD AUTO: 8.89 K/UL — SIGNIFICANT CHANGE UP (ref 3.8–10.5)

## 2022-06-18 PROCEDURE — 99233 SBSQ HOSP IP/OBS HIGH 50: CPT

## 2022-06-18 PROCEDURE — 71045 X-RAY EXAM CHEST 1 VIEW: CPT | Mod: 26

## 2022-06-18 PROCEDURE — 93010 ELECTROCARDIOGRAM REPORT: CPT

## 2022-06-18 RX ORDER — FUROSEMIDE 40 MG
20 TABLET ORAL
Refills: 0 | Status: DISCONTINUED | OUTPATIENT
Start: 2022-06-18 | End: 2022-06-20

## 2022-06-18 RX ORDER — SODIUM CHLORIDE 9 MG/ML
3 INJECTION INTRAMUSCULAR; INTRAVENOUS; SUBCUTANEOUS EVERY 8 HOURS
Refills: 0 | Status: DISCONTINUED | OUTPATIENT
Start: 2022-06-18 | End: 2022-06-23

## 2022-06-18 RX ORDER — ONDANSETRON 8 MG/1
4 TABLET, FILM COATED ORAL ONCE
Refills: 0 | Status: COMPLETED | OUTPATIENT
Start: 2022-06-18 | End: 2022-06-18

## 2022-06-18 RX ORDER — FUROSEMIDE 40 MG
10 TABLET ORAL ONCE
Refills: 0 | Status: COMPLETED | OUTPATIENT
Start: 2022-06-18 | End: 2022-06-18

## 2022-06-18 RX ORDER — METOCLOPRAMIDE HCL 10 MG
10 TABLET ORAL EVERY 8 HOURS
Refills: 0 | Status: COMPLETED | OUTPATIENT
Start: 2022-06-18 | End: 2022-06-18

## 2022-06-18 RX ORDER — SPIRONOLACTONE 25 MG/1
25 TABLET, FILM COATED ORAL DAILY
Refills: 0 | Status: DISCONTINUED | OUTPATIENT
Start: 2022-06-18 | End: 2022-06-20

## 2022-06-18 RX ADMIN — SENNA PLUS 2 TABLET(S): 8.6 TABLET ORAL at 21:08

## 2022-06-18 RX ADMIN — AMIODARONE HYDROCHLORIDE 400 MILLIGRAM(S): 400 TABLET ORAL at 17:04

## 2022-06-18 RX ADMIN — Medication 10 MILLIGRAM(S): at 13:22

## 2022-06-18 RX ADMIN — Medication 20 MILLIGRAM(S): at 10:40

## 2022-06-18 RX ADMIN — GABAPENTIN 100 MILLIGRAM(S): 400 CAPSULE ORAL at 13:22

## 2022-06-18 RX ADMIN — Medication 10 MILLIGRAM(S): at 01:56

## 2022-06-18 RX ADMIN — Medication 25 MILLIGRAM(S): at 05:58

## 2022-06-18 RX ADMIN — Medication 81 MILLIGRAM(S): at 13:22

## 2022-06-18 RX ADMIN — Medication 50 MILLIGRAM(S): at 01:32

## 2022-06-18 RX ADMIN — SPIRONOLACTONE 25 MILLIGRAM(S): 25 TABLET, FILM COATED ORAL at 13:22

## 2022-06-18 RX ADMIN — Medication 10 MILLIGRAM(S): at 06:06

## 2022-06-18 RX ADMIN — Medication 650 MILLIGRAM(S): at 17:04

## 2022-06-18 RX ADMIN — ONDANSETRON 4 MILLIGRAM(S): 8 TABLET, FILM COATED ORAL at 05:58

## 2022-06-18 RX ADMIN — ONDANSETRON 4 MILLIGRAM(S): 8 TABLET, FILM COATED ORAL at 13:22

## 2022-06-18 RX ADMIN — ONDANSETRON 4 MILLIGRAM(S): 8 TABLET, FILM COATED ORAL at 21:09

## 2022-06-18 RX ADMIN — Medication 650 MILLIGRAM(S): at 13:22

## 2022-06-18 RX ADMIN — ONDANSETRON 4 MILLIGRAM(S): 8 TABLET, FILM COATED ORAL at 10:40

## 2022-06-18 RX ADMIN — Medication 25 MILLIGRAM(S): at 13:22

## 2022-06-18 RX ADMIN — Medication 25 MILLIGRAM(S): at 21:08

## 2022-06-18 RX ADMIN — Medication 650 MILLIGRAM(S): at 13:52

## 2022-06-18 RX ADMIN — ENOXAPARIN SODIUM 40 MILLIGRAM(S): 100 INJECTION SUBCUTANEOUS at 13:21

## 2022-06-18 RX ADMIN — GABAPENTIN 100 MILLIGRAM(S): 400 CAPSULE ORAL at 21:08

## 2022-06-18 RX ADMIN — CHLORHEXIDINE GLUCONATE 1 APPLICATION(S): 213 SOLUTION TOPICAL at 13:22

## 2022-06-18 RX ADMIN — ONDANSETRON 4 MILLIGRAM(S): 8 TABLET, FILM COATED ORAL at 00:45

## 2022-06-18 RX ADMIN — Medication 650 MILLIGRAM(S): at 17:35

## 2022-06-18 RX ADMIN — Medication 500 MILLIGRAM(S): at 17:04

## 2022-06-18 RX ADMIN — SODIUM CHLORIDE 3 MILLILITER(S): 9 INJECTION INTRAMUSCULAR; INTRAVENOUS; SUBCUTANEOUS at 21:39

## 2022-06-18 RX ADMIN — Medication 10 MILLIGRAM(S): at 21:08

## 2022-06-18 RX ADMIN — ATORVASTATIN CALCIUM 80 MILLIGRAM(S): 80 TABLET, FILM COATED ORAL at 21:08

## 2022-06-18 NOTE — PROGRESS NOTE ADULT - SUBJECTIVE AND OBJECTIVE BOX
VITAL SIGNS    Telemetry: SR 70-80   Overnight Events: no acute events    Vital Signs Last 24 Hrs  T(C): 36.8 (22 @ 17:45), Max: 37.6 (22 @ 20:00)  T(F): 98.2 (22 @ 17:45), Max: 99.7 (22 @ 20:00)  HR: 76 (22 @ 17:45) (59 - 76)  BP: 111/73 (22 @ 17:45) (111/73 - 169/70)  RR: 18 (22 @ 17:45) (13 - 31)  SpO2: 96% (22 @ 17:45) (94% - 99%)             @ 07:01  -   @ 07:00  --------------------------------------------------------  IN: 1066 mL / OUT: 2175 mL / NET: -1109 mL     @ 07:01  -   @ 19:24  --------------------------------------------------------  IN: 330 mL / OUT: 815 mL / NET: -485 mL       Daily     Daily Weight in k.8 (2022 00:00)  Admit Wt: Drug Dosing Weight  Height (cm): 154.9 (2022 06:43)  Weight (kg): 58.7 (2022 06:43)  BMI (kg/m2): 24.5 (2022 06:43)  BSA (m2): 1.57 (2022 06:43)    Bilirubin Total, Serum: 1.3 mg/dL ( @ 00:56)    CAPILLARY BLOOD GLUCOSE      POCT Blood Glucose.: 172 mg/dL (2022 06:44)  POCT Blood Glucose.: 138 mg/dL (2022 21:57)          acetaminophen     Tablet .. 650 milliGRAM(s) Oral every 6 hours  aMIOdarone    Tablet 400 milliGRAM(s) Oral two times a day  ascorbic acid 500 milliGRAM(s) Oral two times a day  aspirin enteric coated 81 milliGRAM(s) Oral daily  atorvastatin 80 milliGRAM(s) Oral at bedtime  bisacodyl Suppository 10 milliGRAM(s) Rectal once  enoxaparin Injectable 40 milliGRAM(s) SubCutaneous every 24 hours  furosemide    Tablet 20 milliGRAM(s) Oral two times a day  gabapentin 100 milliGRAM(s) Oral every 8 hours  metoclopramide Injectable 10 milliGRAM(s) IV Push every 8 hours  metoprolol tartrate 25 milliGRAM(s) Oral every 8 hours  ondansetron Injectable 4 milliGRAM(s) IV Push every 8 hours  oxyCODONE    IR 5 milliGRAM(s) Oral every 4 hours PRN  oxyCODONE    IR 10 milliGRAM(s) Oral every 4 hours PRN  pantoprazole    Tablet 40 milliGRAM(s) Oral before breakfast  polyethylene glycol 3350 17 Gram(s) Oral daily  senna 2 Tablet(s) Oral at bedtime  sodium chloride 0.9% lock flush 3 milliLiter(s) IV Push every 8 hours  spironolactone 25 milliGRAM(s) Oral daily                            8.6    8.89  )-----------( 107      ( 2022 00:56 )             25.8     06-18    143  |  108  |  22  ----------------------------<  142<H>  4.0   |  23  |  0.89    Ca    8.1<L>      2022 00:56  Phos  4.4     06-18  Mg     2.3     06-18    TPro  5.7<L>  /  Alb  4.2  /  TBili  1.3<H>  /  DBili  x   /  AST  64<H>  /  ALT  37  /  AlkPhos  60  06-18    PT/INR - ( 2022 11:59 )   PT: 15.8 sec;   INR: 1.36 ratio         PTT - ( 2022 11:59 )  PTT:26.2 sec    PHYSICAL EXAM    Subjective: "I feel tired"  General: well appearing female, in no acute distress, laying in bed. POD #2  Neurology: alert and oriented x 3, nonfocal, no gross deficits  CV : S1/S2, no murmurs/rubs/gallops  Sternal Wound : CDI with dressing, Stable, +PW 50/8, Meds x 2 to LWS  Lungs: clear. RR easy, unlabored   Abdomen: soft, nontender, nondistended, positive bowel sounds, +bowel movement today, Neg N/V/D   :  pt voiding without difficulty, due to void 10pm s/p luevano removal   Extremities: BAZZI; no edema, neg calf tenderness. PPP bilaterally, groins stable, b/l SVG sites     Drains:     MS #1  [ x ] Drainage:                 MS #2 [ x ]  Drainage:        serosanguineous    Pacing Wires        [  ]   Settings:                                  Isolated  [ x ]    Coumadin    [ ] YES          [x]      NO         Eliquis    [ ] YES          [x]      NO       Heparin gtt   [  ] YES              [x]   NO  Dose:    Disposition:  Home [   ]     Rehab [   ]       OR Date:    Plan of care discussed with Cardiothoracic Team at AM rounds.                VITAL SIGNS    Telemetry: SR 70-80   Overnight Events: no acute events    Vital Signs Last 24 Hrs  T(C): 36.8 (22 @ 17:45), Max: 37.6 (22 @ 20:00)  T(F): 98.2 (22 @ 17:45), Max: 99.7 (22 @ 20:00)  HR: 76 (22 @ 17:45) (59 - 76)  BP: 111/73 (22 @ 17:45) (111/73 - 169/70)  RR: 18 (22 @ 17:45) (13 - 31)  SpO2: 96% (22 @ 17:45) (94% - 99%)             @ 07:01  -   @ 07:00  --------------------------------------------------------  IN: 1066 mL / OUT: 2175 mL / NET: -1109 mL     @ 07:01  -   @ 19:24  --------------------------------------------------------  IN: 330 mL / OUT: 815 mL / NET: -485 mL       Daily     Daily Weight in k.8 (2022 00:00)  Admit Wt: Drug Dosing Weight  Height (cm): 154.9 (2022 06:43)  Weight (kg): 58.7 (2022 06:43)  BMI (kg/m2): 24.5 (2022 06:43)  BSA (m2): 1.57 (2022 06:43)    Bilirubin Total, Serum: 1.3 mg/dL ( @ 00:56)    CAPILLARY BLOOD GLUCOSE      POCT Blood Glucose.: 172 mg/dL (2022 06:44)  POCT Blood Glucose.: 138 mg/dL (2022 21:57)          acetaminophen     Tablet .. 650 milliGRAM(s) Oral every 6 hours  aMIOdarone    Tablet 400 milliGRAM(s) Oral two times a day  ascorbic acid 500 milliGRAM(s) Oral two times a day  aspirin enteric coated 81 milliGRAM(s) Oral daily  atorvastatin 80 milliGRAM(s) Oral at bedtime  bisacodyl Suppository 10 milliGRAM(s) Rectal once  enoxaparin Injectable 40 milliGRAM(s) SubCutaneous every 24 hours  furosemide    Tablet 20 milliGRAM(s) Oral two times a day  gabapentin 100 milliGRAM(s) Oral every 8 hours  metoclopramide Injectable 10 milliGRAM(s) IV Push every 8 hours  metoprolol tartrate 25 milliGRAM(s) Oral every 8 hours  ondansetron Injectable 4 milliGRAM(s) IV Push every 8 hours  oxyCODONE    IR 5 milliGRAM(s) Oral every 4 hours PRN  oxyCODONE    IR 10 milliGRAM(s) Oral every 4 hours PRN  pantoprazole    Tablet 40 milliGRAM(s) Oral before breakfast  polyethylene glycol 3350 17 Gram(s) Oral daily  senna 2 Tablet(s) Oral at bedtime  sodium chloride 0.9% lock flush 3 milliLiter(s) IV Push every 8 hours  spironolactone 25 milliGRAM(s) Oral daily                            8.6    8.89  )-----------( 107      ( 2022 00:56 )             25.8     06-18    143  |  108  |  22  ----------------------------<  142<H>  4.0   |  23  |  0.89    Ca    8.1<L>      2022 00:56  Phos  4.4     06-18  Mg     2.3     06-18    TPro  5.7<L>  /  Alb  4.2  /  TBili  1.3<H>  /  DBili  x   /  AST  64<H>  /  ALT  37  /  AlkPhos  60  06-18    PT/INR - ( 2022 11:59 )   PT: 15.8 sec;   INR: 1.36 ratio         PTT - ( 2022 11:59 )  PTT:26.2 sec    PHYSICAL EXAM    Subjective: "I feel tired"  General: well appearing female, in no acute distress, laying in bed. POD #2  Neurology: alert and oriented x 3, nonfocal, no gross deficits  CV : S1/S2, no murmurs/rubs/gallops  Sternal Wound : CDI with dressing, Stable, +PW 50/8, Meds x 2 to LWS  Lungs: clear. RR easy, unlabored   Abdomen: soft, nontender, nondistended, positive bowel sounds, +bowel movement today, Neg N/V/D   :  pt voiding without difficulty, due to void 10pm s/p luevano removal   Extremities: BAZZI; no edema, neg calf tenderness. PPP bilaterally, groins stable, b/l SVG sites     Drains:     MS #1/#2  [ x ] Drainage:  190cc in 12hr/680cc in 24hr  serosanguineous    Pacing Wires        [ x ]   Settings: VVI 50/8                                 Isolated  [  ]    Coumadin    [ ] YES          [x]      NO         Eliquis    [ ] YES          [x]      NO       Heparin gtt   [  ] YES              [x]   NO  Dose:    Disposition:  Home [ x  ]     Rehab [   ]       OR Date:    Plan of care discussed with Cardiothoracic Team at AM rounds.

## 2022-06-18 NOTE — PROGRESS NOTE ADULT - PROBLEM SELECTOR PLAN 1
s/p CABG x 2 with Dr. Chang on 6/16/22  c/w ASA 81, Atorvastatin 80, Lopressor 25 TID  ERP Protocol- Gabapentin/Vit C/Amio  Meds x 2 to LWS  +PW 50/8  luevano removal today due to void at 10pm  Incentive spirometry x 10 q 1hr, pulmonary toileting, increase ambulation, PT eval

## 2022-06-18 NOTE — PROGRESS NOTE ADULT - ASSESSMENT
This is a 72 y/o female PMH CAD, severe aortic stenosis, was found to have TVD on cardiac catheterization performed at [facility] in April, denies dyspnea, angina or syncope.  Presents today for aortic valve replacement and CABG X 3.   This is a 72 y/o female PMH CAD, severe aortic stenosis, was found to have TVD on cardiac catheterization performed at [facility] in April, denies dyspnea, angina or syncope.  Presents today for aortic valve replacement and CABG X 3.      Pt s/p C2V, AVR (t) with Dr. Chang on 6/16.    6/16: C2V, AVR (t) with Dr. Chang, Meds x 2, +PW, intraop 3u PBRC, 1 platelet, 2 FFP, RIJ intro  6/18: transferred to floor, Meds to LWS, PW 50/8, due to void at 10pm

## 2022-06-18 NOTE — PROGRESS NOTE ADULT - SUBJECTIVE AND OBJECTIVE BOX
CRITICAL CARE ATTENDING - CTICU    MEDICATIONS  (STANDING):  acetaminophen     Tablet .. 650 milliGRAM(s) Oral every 6 hours  aMIOdarone    Tablet 400 milliGRAM(s) Oral two times a day  ascorbic acid 500 milliGRAM(s) Oral two times a day  aspirin enteric coated 81 milliGRAM(s) Oral daily  atorvastatin 80 milliGRAM(s) Oral at bedtime  bisacodyl Suppository 10 milliGRAM(s) Rectal once  chlorhexidine 2% Cloths 1 Application(s) Topical daily  enoxaparin Injectable 40 milliGRAM(s) SubCutaneous every 24 hours  gabapentin 100 milliGRAM(s) Oral every 8 hours  metoclopramide Injectable 10 milliGRAM(s) IV Push every 8 hours  metoprolol tartrate 25 milliGRAM(s) Oral every 8 hours  ondansetron Injectable 4 milliGRAM(s) IV Push every 8 hours  pantoprazole    Tablet 40 milliGRAM(s) Oral before breakfast  polyethylene glycol 3350 17 Gram(s) Oral daily  senna 2 Tablet(s) Oral at bedtime  sodium chloride 0.9%. 1000 milliLiter(s) (10 mL/Hr) IV Continuous <Continuous>                            8.6    8.89  )-----------( 107      ( 2022 00:56 )             25.8       06-18    143  |  108  |  22  ----------------------------<  142<H>  4.0   |  23  |  0.89    Ca    8.1<L>      2022 00:56  Phos  4.4     -18  Mg     2.3     18    TPro  5.7<L>  /  Alb  4.2  /  TBili  1.3<H>  /  DBili  x   /  AST  64<H>  /  ALT  37  /  AlkPhos  60  06-18      PT/INR - ( 2022 11:59 )   PT: 15.8 sec;   INR: 1.36 ratio         PTT - ( 2022 11:59 )  PTT:26.2 sec        Daily     Daily Weight in k.8 (2022 00:00)      06-16 @ 07:01  -  06-17 @ 07:00  --------------------------------------------------------  IN: 5533 mL / OUT: 5890 mL / NET: -357 mL     @ 07:01  -   @ 06:21  --------------------------------------------------------  IN: 1056 mL / OUT: 2125 mL / NET: -1069 mL      Critically Ill patient  : [ ] preoperative ,   [x] post operative    Requires :  [] Arterial Line   [x] Central Line  [ ] PA catheter  [ ] IABP  [ ] ECMO  [ ] LVAD  [ ] Ventilator  [x] pacemaker -TPM [ ] Impella.                      [x ] ABG's     [ x] Pulse Oxymetry Monitoring  Bedside evaluation , monitoring , treatment of hemodynamics , fluids , IVP/ IVCD meds.        Diagnosis:     POD 2 - C2V, AVR (t) / Aortic Root Enlargement    Severe AS     CAD     Requires chest PT, pulmonary toilet, suctioning to maintain SaO2,  patent airway and treat atelectasis.     CHF- acute [ x]   chronic [ x]    systolic [ x]   diatolic [ x]          - Echo- EF -   60% / AS          [x ] RV dysfunction          - Cxr-cardiomegally, edema          - Clinical-  [ ]inotropes   [ ]pressors   [ ]diuresis   [ ]IABP   [ ]ECMO   [ ]LVAD   [ ]Respiratory Failure.     Temporary pacemaker (TPM) interrogation and setting.     Hypovolemia     Hypokalemia     Thrombocytopenia     Hypernatremia     Requires bedside physical therapy, mobilization and total snf care.                     By signing my name below, I, Dianna Heard, attest that this documentation has been prepared under the direction and in the presence of Len Mann MD.   Electronically Signed: Patrick Nino 22 @ 06:21      Discussed with CT surgeon, Physician Assistant - Nurse Practitioner- Critical care medicine team.   Discussed at  AM / PM rounds.   Chart, labs , films reviewed.    Cumulative Critical Care Time Given Today:  CRITICAL CARE ATTENDING - CTICU    MEDICATIONS  (STANDING):  acetaminophen     Tablet .. 650 milliGRAM(s) Oral every 6 hours  aMIOdarone    Tablet 400 milliGRAM(s) Oral two times a day  ascorbic acid 500 milliGRAM(s) Oral two times a day  aspirin enteric coated 81 milliGRAM(s) Oral daily  atorvastatin 80 milliGRAM(s) Oral at bedtime  bisacodyl Suppository 10 milliGRAM(s) Rectal once  chlorhexidine 2% Cloths 1 Application(s) Topical daily  enoxaparin Injectable 40 milliGRAM(s) SubCutaneous every 24 hours  gabapentin 100 milliGRAM(s) Oral every 8 hours  metoclopramide Injectable 10 milliGRAM(s) IV Push every 8 hours  metoprolol tartrate 25 milliGRAM(s) Oral every 8 hours  ondansetron Injectable 4 milliGRAM(s) IV Push every 8 hours  pantoprazole    Tablet 40 milliGRAM(s) Oral before breakfast  polyethylene glycol 3350 17 Gram(s) Oral daily  senna 2 Tablet(s) Oral at bedtime  sodium chloride 0.9%. 1000 milliLiter(s) (10 mL/Hr) IV Continuous <Continuous>                            8.6    8.89  )-----------( 107      ( 2022 00:56 )             25.8       06-18    143  |  108  |  22  ----------------------------<  142<H>  4.0   |  23  |  0.89    Ca    8.1<L>      2022 00:56  Phos  4.4     -18  Mg     2.3     18    TPro  5.7<L>  /  Alb  4.2  /  TBili  1.3<H>  /  DBili  x   /  AST  64<H>  /  ALT  37  /  AlkPhos  60  06-18      PT/INR - ( 2022 11:59 )   PT: 15.8 sec;   INR: 1.36 ratio         PTT - ( 2022 11:59 )  PTT:26.2 sec        Daily     Daily Weight in k.8 (2022 00:00)      06-16 @ 07:01  -  06-17 @ 07:00  --------------------------------------------------------  IN: 5533 mL / OUT: 5890 mL / NET: -357 mL     @ 07:01  -   @ 06:21  --------------------------------------------------------  IN: 1056 mL / OUT: 2125 mL / NET: -1069 mL      Critically Ill patient  : [ ] preoperative ,   [x] post operative    Requires :  [] Arterial Line   [x] Central Line  [ ] PA catheter  [ ] IABP  [ ] ECMO  [ ] LVAD  [ ] Ventilator  [x] pacemaker -TPM [ ] Impella.                      [x ] ABG's     [ x] Pulse Oxymetry Monitoring  Bedside evaluation , monitoring , treatment of hemodynamics , fluids , IVP/ IVCD meds.        Diagnosis:     POD 2 - C2V, AVR (t) / Aortic Root Enlargement    Severe AS     CAD     Requires chest PT, pulmonary toilet, suctioning to maintain SaO2,  patent airway and treat atelectasis.     CHF- acute [ x]   chronic [ x]    systolic [ x]   diatolic [ x]          - Echo- EF -   60% / AS          [x ] RV dysfunction          - Cxr-cardiomegally, edema          - Clinical-  [x ]inotropes - weaned to off   [ ]pressors   [x ]diuresis   [ ]IABP   [ ]ECMO   [ ]LVAD   [ ]Respiratory Failure.     Temporary pacemaker (TPM) interrogation and setting.     Hypovolemia     Thrombocytopenia     Requires bedside physical therapy, mobilization and total FDC care.     ASA / lopressor                     By signing my name below, I, Dianna Heard, attest that this documentation has been prepared under the direction and in the presence of Len Mann MD.   Electronically Signed: Patrick Nino 22 @ 06:21    I, Len Mann, personally performed the services described in this documentation. All medical record entries made by the scribe were at my direction and in my presence. I have reviewed the chart and agree that the record reflects my personal performance and is accurate and complete.   Len Mann MD.       Discussed with CT surgeon, Physician Assistant - Nurse Practitioner- Critical care medicine team.   Discussed at  AM / PM rounds.   Chart, labs , films reviewed.    Cumulative Critical Care Time Given Today:  20 min

## 2022-06-18 NOTE — PROGRESS NOTE ADULT - PROBLEM SELECTOR PLAN 2
s/p AVR (t) with Dr. Chang on 6/16/22  c/w Lasix 20 BID, Aldactone 25 QD  Incentive spirometry x 10 q 1hr, pulmonary toileting, increase ambulation, PT eval

## 2022-06-19 DIAGNOSIS — I48.91 UNSPECIFIED ATRIAL FIBRILLATION: ICD-10-CM

## 2022-06-19 LAB
ALBUMIN SERPL ELPH-MCNC: 4.1 G/DL — SIGNIFICANT CHANGE UP (ref 3.3–5)
ALP SERPL-CCNC: 147 U/L — HIGH (ref 40–120)
ALT FLD-CCNC: 108 U/L — HIGH (ref 10–45)
ANION GAP SERPL CALC-SCNC: 13 MMOL/L — SIGNIFICANT CHANGE UP (ref 5–17)
APPEARANCE UR: CLEAR — SIGNIFICANT CHANGE UP
AST SERPL-CCNC: 91 U/L — HIGH (ref 10–40)
BACTERIA # UR AUTO: NEGATIVE — SIGNIFICANT CHANGE UP
BILIRUB SERPL-MCNC: 1.6 MG/DL — HIGH (ref 0.2–1.2)
BILIRUB UR-MCNC: NEGATIVE — SIGNIFICANT CHANGE UP
BUN SERPL-MCNC: 44 MG/DL — HIGH (ref 7–23)
CALCIUM SERPL-MCNC: 8.3 MG/DL — LOW (ref 8.4–10.5)
CHLORIDE SERPL-SCNC: 102 MMOL/L — SIGNIFICANT CHANGE UP (ref 96–108)
CO2 SERPL-SCNC: 22 MMOL/L — SIGNIFICANT CHANGE UP (ref 22–31)
COLOR SPEC: YELLOW — SIGNIFICANT CHANGE UP
CREAT SERPL-MCNC: 0.94 MG/DL — SIGNIFICANT CHANGE UP (ref 0.5–1.3)
DIFF PNL FLD: ABNORMAL
EGFR: 64 ML/MIN/1.73M2 — SIGNIFICANT CHANGE UP
EPI CELLS # UR: 2 /HPF — SIGNIFICANT CHANGE UP
GLUCOSE SERPL-MCNC: 144 MG/DL — HIGH (ref 70–99)
GLUCOSE UR QL: NEGATIVE — SIGNIFICANT CHANGE UP
HCT VFR BLD CALC: 27.1 % — LOW (ref 34.5–45)
HGB BLD-MCNC: 8.9 G/DL — LOW (ref 11.5–15.5)
HYALINE CASTS # UR AUTO: 16 /LPF — HIGH (ref 0–2)
KETONES UR-MCNC: NEGATIVE — SIGNIFICANT CHANGE UP
LEUKOCYTE ESTERASE UR-ACNC: ABNORMAL
MCHC RBC-ENTMCNC: 30.2 PG — SIGNIFICANT CHANGE UP (ref 27–34)
MCHC RBC-ENTMCNC: 32.8 GM/DL — SIGNIFICANT CHANGE UP (ref 32–36)
MCV RBC AUTO: 91.9 FL — SIGNIFICANT CHANGE UP (ref 80–100)
NITRITE UR-MCNC: NEGATIVE — SIGNIFICANT CHANGE UP
NRBC # BLD: 0 /100 WBCS — SIGNIFICANT CHANGE UP (ref 0–0)
PH UR: 6 — SIGNIFICANT CHANGE UP (ref 5–8)
PLATELET # BLD AUTO: 111 K/UL — LOW (ref 150–400)
POTASSIUM SERPL-MCNC: 4 MMOL/L — SIGNIFICANT CHANGE UP (ref 3.5–5.3)
POTASSIUM SERPL-SCNC: 4 MMOL/L — SIGNIFICANT CHANGE UP (ref 3.5–5.3)
PROT SERPL-MCNC: 6.1 G/DL — SIGNIFICANT CHANGE UP (ref 6–8.3)
PROT UR-MCNC: ABNORMAL
RBC # BLD: 2.95 M/UL — LOW (ref 3.8–5.2)
RBC # FLD: 15.5 % — HIGH (ref 10.3–14.5)
RBC CASTS # UR COMP ASSIST: 5 /HPF — HIGH (ref 0–4)
SODIUM SERPL-SCNC: 137 MMOL/L — SIGNIFICANT CHANGE UP (ref 135–145)
SP GR SPEC: 1.02 — SIGNIFICANT CHANGE UP (ref 1.01–1.02)
UROBILINOGEN FLD QL: NEGATIVE — SIGNIFICANT CHANGE UP
WBC # BLD: 13.16 K/UL — HIGH (ref 3.8–10.5)
WBC # FLD AUTO: 13.16 K/UL — HIGH (ref 3.8–10.5)
WBC UR QL: 5 /HPF — SIGNIFICANT CHANGE UP (ref 0–5)

## 2022-06-19 PROCEDURE — 71045 X-RAY EXAM CHEST 1 VIEW: CPT | Mod: 26

## 2022-06-19 RX ORDER — ACETAMINOPHEN 500 MG
1000 TABLET ORAL ONCE
Refills: 0 | Status: COMPLETED | OUTPATIENT
Start: 2022-06-19 | End: 2022-06-19

## 2022-06-19 RX ORDER — AMIODARONE HYDROCHLORIDE 400 MG/1
400 TABLET ORAL EVERY 8 HOURS
Refills: 0 | Status: DISCONTINUED | OUTPATIENT
Start: 2022-06-19 | End: 2022-06-20

## 2022-06-19 RX ORDER — METOPROLOL TARTRATE 50 MG
50 TABLET ORAL
Refills: 0 | Status: DISCONTINUED | OUTPATIENT
Start: 2022-06-19 | End: 2022-06-22

## 2022-06-19 RX ORDER — METOPROLOL TARTRATE 50 MG
2.5 TABLET ORAL ONCE
Refills: 0 | Status: COMPLETED | OUTPATIENT
Start: 2022-06-19 | End: 2022-06-19

## 2022-06-19 RX ORDER — MAGNESIUM SULFATE 500 MG/ML
2 VIAL (ML) INJECTION ONCE
Refills: 0 | Status: COMPLETED | OUTPATIENT
Start: 2022-06-19 | End: 2022-06-19

## 2022-06-19 RX ORDER — AMIODARONE HYDROCHLORIDE 400 MG/1
TABLET ORAL
Refills: 0 | Status: DISCONTINUED | OUTPATIENT
Start: 2022-06-19 | End: 2022-06-20

## 2022-06-19 RX ADMIN — ONDANSETRON 4 MILLIGRAM(S): 8 TABLET, FILM COATED ORAL at 06:20

## 2022-06-19 RX ADMIN — SPIRONOLACTONE 25 MILLIGRAM(S): 25 TABLET, FILM COATED ORAL at 06:01

## 2022-06-19 RX ADMIN — SODIUM CHLORIDE 3 MILLILITER(S): 9 INJECTION INTRAMUSCULAR; INTRAVENOUS; SUBCUTANEOUS at 13:37

## 2022-06-19 RX ADMIN — Medication 2.5 MILLIGRAM(S): at 01:15

## 2022-06-19 RX ADMIN — Medication 81 MILLIGRAM(S): at 10:00

## 2022-06-19 RX ADMIN — PANTOPRAZOLE SODIUM 40 MILLIGRAM(S): 20 TABLET, DELAYED RELEASE ORAL at 06:02

## 2022-06-19 RX ADMIN — Medication 500 MILLIGRAM(S): at 17:17

## 2022-06-19 RX ADMIN — AMIODARONE HYDROCHLORIDE 400 MILLIGRAM(S): 400 TABLET ORAL at 06:02

## 2022-06-19 RX ADMIN — Medication 500 MILLIGRAM(S): at 06:01

## 2022-06-19 RX ADMIN — Medication 20 MILLIGRAM(S): at 13:49

## 2022-06-19 RX ADMIN — AMIODARONE HYDROCHLORIDE 400 MILLIGRAM(S): 400 TABLET ORAL at 22:32

## 2022-06-19 RX ADMIN — ATORVASTATIN CALCIUM 80 MILLIGRAM(S): 80 TABLET, FILM COATED ORAL at 22:33

## 2022-06-19 RX ADMIN — ENOXAPARIN SODIUM 40 MILLIGRAM(S): 100 INJECTION SUBCUTANEOUS at 13:48

## 2022-06-19 RX ADMIN — SENNA PLUS 2 TABLET(S): 8.6 TABLET ORAL at 22:33

## 2022-06-19 RX ADMIN — Medication 50 GRAM(S): at 01:52

## 2022-06-19 RX ADMIN — Medication 20 MILLIGRAM(S): at 06:01

## 2022-06-19 RX ADMIN — Medication 1000 MILLIGRAM(S): at 10:28

## 2022-06-19 RX ADMIN — Medication 400 MILLIGRAM(S): at 09:58

## 2022-06-19 RX ADMIN — GABAPENTIN 100 MILLIGRAM(S): 400 CAPSULE ORAL at 06:02

## 2022-06-19 RX ADMIN — Medication 50 MILLIGRAM(S): at 06:07

## 2022-06-19 RX ADMIN — SODIUM CHLORIDE 3 MILLILITER(S): 9 INJECTION INTRAMUSCULAR; INTRAVENOUS; SUBCUTANEOUS at 22:34

## 2022-06-19 RX ADMIN — SODIUM CHLORIDE 3 MILLILITER(S): 9 INJECTION INTRAMUSCULAR; INTRAVENOUS; SUBCUTANEOUS at 06:28

## 2022-06-19 RX ADMIN — ONDANSETRON 4 MILLIGRAM(S): 8 TABLET, FILM COATED ORAL at 22:33

## 2022-06-19 RX ADMIN — GABAPENTIN 100 MILLIGRAM(S): 400 CAPSULE ORAL at 13:48

## 2022-06-19 RX ADMIN — Medication 50 MILLIGRAM(S): at 17:16

## 2022-06-19 RX ADMIN — ONDANSETRON 4 MILLIGRAM(S): 8 TABLET, FILM COATED ORAL at 13:48

## 2022-06-19 RX ADMIN — GABAPENTIN 100 MILLIGRAM(S): 400 CAPSULE ORAL at 22:33

## 2022-06-19 RX ADMIN — AMIODARONE HYDROCHLORIDE 400 MILLIGRAM(S): 400 TABLET ORAL at 13:48

## 2022-06-19 NOTE — PROGRESS NOTE ADULT - PROBLEM SELECTOR PLAN 2
s/p C2V/AVR(T) 6/16/22  Continue ASA 81, Atorvastatin 80 mg qhs, and Lopressor 50 mg BID   Continue diuresis with Lasix 20 mg PO BID & Aldactone 25 mg QD   Maintain meds x 2 to LWS - will re-evaluate drainage s/p ambulation and possibly d/c   Maintain PW VVI 50/8   Barber reinserted for retention on 6/18 - will maintain for now   Increase activity as tolerated, ambulate 4x daily and w/ PT   Chest PT, encourage incentive spirometry 10x/hr while awake   Pain control with Neurontin, Oxycodone, & Tylenol prn as per ERP protocol   GI ppx w/ Protonix, DVT ppx w/ Lovenox   Dispo: home w/ no PT needs when medically cleared

## 2022-06-19 NOTE — PROGRESS NOTE ADULT - ASSESSMENT
This is a 70 y/o female PMH CAD, severe aortic stenosis, was found to have TVD on cardiac catheterization performed at [facility] in April, denies dyspnea, angina or syncope.  Presents today for aortic valve replacement and CABG X 3.      Pt s/p C2V, AVR (t) with Dr. Chang on 6/16.    6/16: C2V, AVR (t) with Dr. Chang, Meds x 2, +PW, intraop 3u PBRC, 1 platelet, 2 FFP, RIJ intro  6/18: transferred to floor, Meds to LWS, PW 50/8, due to void at 10pm  6/19 VSS, NAD. Pt with 7 hrs afib 110s-130s overnight - Lopressor increased to 50 mg BID, Lopressor 2.5 mg IV x 1/Mg given, PO amio load initiated - then converted to SR 60s-70s at 07:30. Sm R PTX on CXR yesterday - stable on today's CXR. Med CT x 2 with 60cc output overnight, 300cc in 24 hrs - will check drainage s/p ambulation and possibly d/c this afternoon if minimal. Barber re-inserted overnight for retention, will maintain for now. WBC trending up 8 -->13, MSI and b/l SVG sites CDI with no erythema or drainage noted. UA sent - small leuk esterase, no WBC/bacteria/nitrites, will f/u UCx.   Dispo: home w/ no PT when medically cleared

## 2022-06-19 NOTE — PROGRESS NOTE ADULT - PROBLEM SELECTOR PLAN 1
s/p C2V/AVR(T) 6/16/22  Continue post-op care   Continue ASA 81, Atorvastatin 80 mg qhs, and Lopressor 50 mg BID   Continue diuresis with Lasix 20 mg PO BID & Aldactone 25 mg QD   Maintain meds x 2 to LWS - will re-evaluate drainage s/p ambulation and possibly d/c   Maintain PW VVI 50/8   Barber reinserted for retention on 6/18 - will maintain for now   Increase activity as tolerated, ambulate 4x daily and w/ PT   Chest PT, encourage incentive spirometry 10x/hr while awake   Pain control with Neurontin, Oxycodone, & Tylenol prn as per ERP protocol   GI ppx w/ Protonix, DVT ppx w/ Lovenox   Dispo: home w/ no PT needs when medically cleared

## 2022-06-19 NOTE — PROGRESS NOTE ADULT - SUBJECTIVE AND OBJECTIVE BOX
Subjective:    Telemetry: SR --> afib 110s-130s at 00:36 on 6/19       Vital Signs Last 24 Hrs  T(C): 36.7 (06-19-22 @ 04:35), Max: 37.3 (06-18-22 @ 12:00)  T(F): 98 (06-19-22 @ 04:35), Max: 99.1 (06-18-22 @ 12:00)  HR: 75 (06-19-22 @ 04:35) (60 - 101)  BP: 113/75 (06-19-22 @ 04:35) (111/73 - 162/74)  RR: 18 (06-19-22 @ 04:35) (14 - 31)  SpO2: 94% (06-19-22 @ 04:35) (94% - 99%)             06-18 @ 07:01  -  06-19 @ 07:00  --------------------------------------------------------  IN: 810 mL / OUT: 1245 mL / NET: -435 mL          Drains:     MS x 2   [ x ] Drainage: 60cc overnight, 300cc in 24 hrs                   Pacing Wires        [ x ]   Settings: VVI 50/8                                 Isolated  [  ]                         8.6    8.89  )-----------( 107      ( 18 Jun 2022 00:56 )             25.8     06-18    143  |  108  |  22  ----------------------------<  142<H>  4.0   |  23  |  0.89    Ca    8.1<L>      18 Jun 2022 00:56  Phos  4.4     06-18  Mg     2.3     06-18    TPro  5.7<L>  /  Alb  4.2  /  TBili  1.3<H>  /  DBili  x   /  AST  64<H>  /  ALT  37  /  AlkPhos  60  06-18    PT/INR - ( 17 Jun 2022 11:59 )   PT: 15.8 sec;   INR: 1.36 ratio      PTT - ( 17 Jun 2022 11:59 )  PTT:26.2 sec      PHYSICAL EXAM:    Neurology: alert and oriented x 3, nonfocal, no gross deficits    CV:    Sternal Wound: CDI, Stable    Lungs:    Abdomen: soft, nontender, nondistended, (+) bowel sounds, (+) BM    :               Extremities:           acetaminophen Tablet 650 milliGRAM(s) Oral every 6 hours  aMIOdarone Tablet 400 milliGRAM(s) Oral every 8 hours  ascorbic acid 500 milliGRAM(s) Oral two times a day  aspirin enteric coated 81 milliGRAM(s) Oral daily  atorvastatin 80 milliGRAM(s) Oral at bedtime  bisacodyl Suppository 10 milliGRAM(s) Rectal once  enoxaparin Injectable 40 milliGRAM(s) SubCutaneous every 24 hours  furosemide Tablet 20 milliGRAM(s) Oral two times a day  gabapentin 100 milliGRAM(s) Oral every 8 hours  metoprolol tartrate 50 milliGRAM(s) Oral two times a day  ondansetron Injectable 4 milliGRAM(s) IV Push every 8 hours  oxyCODONE IR 5 milliGRAM(s) Oral every 4 hours PRN  oxyCODONE IR 10 milliGRAM(s) Oral every 4 hours PRN  pantoprazole Tablet 40 milliGRAM(s) Oral before breakfast  polyethylene glycol 3350 17 Gram(s) Oral daily  senna 2 Tablet(s) Oral at bedtime  spironolactone 25 milliGRAM(s) Oral daily       Physical Therapy Rec:   Home  [ x ]   Home w/ PT  [  ]  Rehab  [  ]    Discussed with Cardiothoracic Team at AM rounds. Subjective: "I've been a little nauseous for the last few days, but haven't thrown up." Denies chest pain, SOB, palpitations, headache, dizziness, fever, chills, and v/d.     Telemetry: SR --> afib 110s-130s at 00:36 on 6/19       Vital Signs Last 24 Hrs  T(C): 36.7 (06-19-22 @ 04:35), Max: 37.3 (06-18-22 @ 12:00)  T(F): 98 (06-19-22 @ 04:35), Max: 99.1 (06-18-22 @ 12:00)  HR: 75 (06-19-22 @ 04:35) (60 - 101)  BP: 113/75 (06-19-22 @ 04:35) (111/73 - 162/74)  RR: 18 (06-19-22 @ 04:35) (14 - 31)  SpO2: 94% (06-19-22 @ 04:35) (94% - 99%)             06-18 @ 07:01  -  06-19 @ 07:00  --------------------------------------------------------  IN: 810 mL / OUT: 1245 mL / NET: -435 mL          Drains:     MS x 2   [ x ] Drainage: 60cc overnight, 300cc in 24 hrs                   Pacing Wires        [ x ]   Settings: VVI 50/8                                 Isolated  [  ]                         8.6    8.89  )-----------( 107      ( 18 Jun 2022 00:56 )             25.8     06-18    143  |  108  |  22  ----------------------------<  142<H>  4.0   |  23  |  0.89    Ca    8.1<L>      18 Jun 2022 00:56  Phos  4.4     06-18  Mg     2.3     06-18    TPro  5.7<L>  /  Alb  4.2  /  TBili  1.3<H>  /  DBili  x   /  AST  64<H>  /  ALT  37  /  AlkPhos  60  06-18    PT/INR - ( 17 Jun 2022 11:59 )   PT: 15.8 sec;   INR: 1.36 ratio      PTT - ( 17 Jun 2022 11:59 )  PTT:26.2 sec      PHYSICAL EXAM:    Neurology: alert and oriented x 3, nonfocal, no gross deficits    CV: (+) PW - VVI 50/8    Sternal Wound: MSI ALIZA - CDI, Stable    Lungs: (+) med CT x 2 - 60cc output overnight, 300cc in 24 hrs     Abdomen: soft, nontender, nondistended, (+) bowel sounds, (+) BM - last 6/18    : voiding w/ luevano in place               Extremities: (+) b/l SVG sites ALIZA - CDI, no edema b/l LE, no calf tenderness, PPP b/l, BAZZI          acetaminophen Tablet 650 milliGRAM(s) Oral every 6 hours  aMIOdarone Tablet 400 milliGRAM(s) Oral every 8 hours  ascorbic acid 500 milliGRAM(s) Oral two times a day  aspirin enteric coated 81 milliGRAM(s) Oral daily  atorvastatin 80 milliGRAM(s) Oral at bedtime  bisacodyl Suppository 10 milliGRAM(s) Rectal once  enoxaparin Injectable 40 milliGRAM(s) SubCutaneous every 24 hours  furosemide Tablet 20 milliGRAM(s) Oral two times a day  gabapentin 100 milliGRAM(s) Oral every 8 hours  metoprolol tartrate 50 milliGRAM(s) Oral two times a day  ondansetron Injectable 4 milliGRAM(s) IV Push every 8 hours  oxyCODONE IR 5 milliGRAM(s) Oral every 4 hours PRN  oxyCODONE IR 10 milliGRAM(s) Oral every 4 hours PRN  pantoprazole Tablet 40 milliGRAM(s) Oral before breakfast  polyethylene glycol 3350 17 Gram(s) Oral daily  senna 2 Tablet(s) Oral at bedtime  spironolactone 25 milliGRAM(s) Oral daily       Physical Therapy Rec:   Home  [ x ]   Home w/ PT  [  ]  Rehab  [  ]    Discussed with Cardiothoracic Team at AM rounds.

## 2022-06-19 NOTE — PROGRESS NOTE ADULT - PROBLEM SELECTOR PLAN 4
Pt with 7 hrs afib from 00:30 --> 07:30 on 6/19   Pt now SR 60s-70s   Continue Lopressor 50 mg BID   Continue Amio load initiated 6/19   Check lytes daily and replete prn, K > 4, Mg > 2

## 2022-06-20 LAB
ANION GAP SERPL CALC-SCNC: 14 MMOL/L — SIGNIFICANT CHANGE UP (ref 5–17)
BUN SERPL-MCNC: 47 MG/DL — HIGH (ref 7–23)
CALCIUM SERPL-MCNC: 8.3 MG/DL — LOW (ref 8.4–10.5)
CHLORIDE SERPL-SCNC: 101 MMOL/L — SIGNIFICANT CHANGE UP (ref 96–108)
CO2 SERPL-SCNC: 23 MMOL/L — SIGNIFICANT CHANGE UP (ref 22–31)
CREAT SERPL-MCNC: 1.02 MG/DL — SIGNIFICANT CHANGE UP (ref 0.5–1.3)
CULTURE RESULTS: NO GROWTH — SIGNIFICANT CHANGE UP
EGFR: 58 ML/MIN/1.73M2 — LOW
GLUCOSE SERPL-MCNC: 112 MG/DL — HIGH (ref 70–99)
HCT VFR BLD CALC: 27.1 % — LOW (ref 34.5–45)
HGB BLD-MCNC: 9.2 G/DL — LOW (ref 11.5–15.5)
MAGNESIUM SERPL-MCNC: 3 MG/DL — HIGH (ref 1.6–2.6)
MCHC RBC-ENTMCNC: 31.5 PG — SIGNIFICANT CHANGE UP (ref 27–34)
MCHC RBC-ENTMCNC: 33.9 GM/DL — SIGNIFICANT CHANGE UP (ref 32–36)
MCV RBC AUTO: 92.8 FL — SIGNIFICANT CHANGE UP (ref 80–100)
NRBC # BLD: 1 /100 WBCS — HIGH (ref 0–0)
PLATELET # BLD AUTO: 134 K/UL — LOW (ref 150–400)
POTASSIUM SERPL-MCNC: 3.6 MMOL/L — SIGNIFICANT CHANGE UP (ref 3.5–5.3)
POTASSIUM SERPL-SCNC: 3.6 MMOL/L — SIGNIFICANT CHANGE UP (ref 3.5–5.3)
RBC # BLD: 2.92 M/UL — LOW (ref 3.8–5.2)
RBC # FLD: 15.3 % — HIGH (ref 10.3–14.5)
SODIUM SERPL-SCNC: 138 MMOL/L — SIGNIFICANT CHANGE UP (ref 135–145)
SPECIMEN SOURCE: SIGNIFICANT CHANGE UP
WBC # BLD: 11.06 K/UL — HIGH (ref 3.8–10.5)
WBC # FLD AUTO: 11.06 K/UL — HIGH (ref 3.8–10.5)

## 2022-06-20 RX ORDER — POTASSIUM BICARBONATE 978 MG/1
25 TABLET, EFFERVESCENT ORAL
Refills: 0 | Status: COMPLETED | OUTPATIENT
Start: 2022-06-20 | End: 2022-06-20

## 2022-06-20 RX ORDER — ALPRAZOLAM 0.25 MG
0.25 TABLET ORAL EVERY 8 HOURS
Refills: 0 | Status: DISCONTINUED | OUTPATIENT
Start: 2022-06-20 | End: 2022-06-23

## 2022-06-20 RX ORDER — METOCLOPRAMIDE HCL 10 MG
10 TABLET ORAL ONCE
Refills: 0 | Status: COMPLETED | OUTPATIENT
Start: 2022-06-20 | End: 2022-06-20

## 2022-06-20 RX ADMIN — POTASSIUM BICARBONATE 25 MILLIEQUIVALENT(S): 978 TABLET, EFFERVESCENT ORAL at 15:02

## 2022-06-20 RX ADMIN — SODIUM CHLORIDE 3 MILLILITER(S): 9 INJECTION INTRAMUSCULAR; INTRAVENOUS; SUBCUTANEOUS at 15:02

## 2022-06-20 RX ADMIN — SODIUM CHLORIDE 3 MILLILITER(S): 9 INJECTION INTRAMUSCULAR; INTRAVENOUS; SUBCUTANEOUS at 06:43

## 2022-06-20 RX ADMIN — ATORVASTATIN CALCIUM 80 MILLIGRAM(S): 80 TABLET, FILM COATED ORAL at 22:21

## 2022-06-20 RX ADMIN — SODIUM CHLORIDE 3 MILLILITER(S): 9 INJECTION INTRAMUSCULAR; INTRAVENOUS; SUBCUTANEOUS at 22:27

## 2022-06-20 RX ADMIN — POTASSIUM BICARBONATE 25 MILLIEQUIVALENT(S): 978 TABLET, EFFERVESCENT ORAL at 16:04

## 2022-06-20 RX ADMIN — Medication 81 MILLIGRAM(S): at 16:04

## 2022-06-20 RX ADMIN — Medication 0.25 MILLIGRAM(S): at 16:04

## 2022-06-20 RX ADMIN — Medication 50 MILLIGRAM(S): at 06:42

## 2022-06-20 RX ADMIN — Medication 500 MILLIGRAM(S): at 18:36

## 2022-06-20 RX ADMIN — GABAPENTIN 100 MILLIGRAM(S): 400 CAPSULE ORAL at 16:05

## 2022-06-20 RX ADMIN — SENNA PLUS 2 TABLET(S): 8.6 TABLET ORAL at 22:21

## 2022-06-20 RX ADMIN — GABAPENTIN 100 MILLIGRAM(S): 400 CAPSULE ORAL at 06:41

## 2022-06-20 RX ADMIN — ENOXAPARIN SODIUM 40 MILLIGRAM(S): 100 INJECTION SUBCUTANEOUS at 12:06

## 2022-06-20 RX ADMIN — Medication 500 MILLIGRAM(S): at 06:42

## 2022-06-20 RX ADMIN — Medication 50 MILLIGRAM(S): at 18:36

## 2022-06-20 RX ADMIN — Medication 10 MILLIGRAM(S): at 16:03

## 2022-06-20 RX ADMIN — Medication 20 MILLIGRAM(S): at 06:41

## 2022-06-20 RX ADMIN — PANTOPRAZOLE SODIUM 40 MILLIGRAM(S): 20 TABLET, DELAYED RELEASE ORAL at 06:42

## 2022-06-20 RX ADMIN — GABAPENTIN 100 MILLIGRAM(S): 400 CAPSULE ORAL at 22:21

## 2022-06-20 RX ADMIN — ONDANSETRON 4 MILLIGRAM(S): 8 TABLET, FILM COATED ORAL at 06:42

## 2022-06-20 RX ADMIN — SPIRONOLACTONE 25 MILLIGRAM(S): 25 TABLET, FILM COATED ORAL at 06:42

## 2022-06-20 RX ADMIN — ONDANSETRON 4 MILLIGRAM(S): 8 TABLET, FILM COATED ORAL at 13:08

## 2022-06-20 NOTE — PROGRESS NOTE ADULT - ASSESSMENT
This is a 72 y/o female PMH CAD, severe aortic stenosis, was found to have TVD on cardiac catheterization performed at [facility] in April, denies dyspnea, angina or syncope.  Presents today for aortic valve replacement and CABG X 3.      Pt s/p C2V, AVR (t) with Dr. Chang on 6/16.    6/16: C2V, AVR (t) with Dr. Chang, Meds x 2, +PW, intraop 3u PBRC, 1 platelet, 2 FFP, RIJ intro  6/18: transferred to floor, Meds to LWS, PW 50/8, due to void at 10pm  6/19 VSS, NAD. Pt with 7 hrs afib 110s-130s overnight - Lopressor increased to 50 mg BID, Lopressor 2.5 mg IV x 1/Mg given, PO amio load initiated - then converted to SR 60s-70s at 07:30. Sm R PTX on CXR yesterday - stable on today's CXR. Med CT x 2 with 60cc output overnight, 300cc in 24 hrs - will check drainage s/p ambulation and possibly d/c this afternoon if minimal. Barber re-inserted overnight for retention, will maintain for now. WBC trending up 8 -->13, MSI and b/l SVG sites CDI with no erythema or drainage noted. UA sent - small leuk esterase, no WBC/bacteria/nitrites, will f/u UCx.   Dispo: home w/ no PT when medically cleared This is a 72 y/o female PMH CAD, severe aortic stenosis, was found to have TVD on cardiac catheterization performed at [facility] in April, denies dyspnea, angina or syncope.  Presents today for aortic valve replacement and CABG X 3.      Pt s/p C2V, AVR (t) with Dr. Chang on 6/16.    6/16: C2V, AVR (t) with Dr. Chang, Meds x 2, +PW, intraop 3u PBRC, 1 platelet, 2 FFP, RIJ intro  6/18: transferred to floor, Meds to LWS, PW 50/8, due to void at 10pm  6/19 VSS, NAD. Pt with 7 hrs afib 110s-130s overnight - Lopressor increased to 50 mg BID, Lopressor 2.5 mg IV x 1/Mg given, PO amio load initiated - then converted to SR 60s-70s at 07:30. Sm R PTX on CXR yesterday - stable on today's CXR. Med CT x 2 with 60cc output overnight, 300cc in 24 hrs - will check drainage s/p ambulation and possibly d/c this afternoon if minimal. Luevano re-inserted overnight for retention, will maintain for now. WBC trending up 8 -->13, MSI and b/l SVG sites CDI with no erythema or drainage noted. UA sent - small leuk esterase, no WBC/bacteria/nitrites, will f/u UCx.   6/20 VSS; RSR 60-70; amio d/c as per Dr. Chang secondary to nausea; bun increased to 47 today--> diuretics d/c; await urine cx; wbc down to 11 today; d/c luevano in am for voiding trial  supplement hypokalemia ; Pw isolated   Dispo: home w/ no PT when medically cleared

## 2022-06-20 NOTE — PROGRESS NOTE ADULT - PROBLEM SELECTOR PLAN 4
Pt with 7 hrs afib from 00:30 --> 07:30 on 6/19   Pt now SR 60s-70s   Continue Lopressor 50 mg BID   Continue Amio load initiated 6/19   Check lytes daily and replete prn, K > 4, Mg > 2 Pt with 7 hrs afib from 00:30 --> 07:30 on 6/19   Pt now SR 60s-70s   Continue Lopressor 50 mg BID   Continue Amio load initiated 6/19 --> d/c this am secondary to nausea  pw isolated   Check lytes daily and replete prn, K > 4, Mg > 2

## 2022-06-20 NOTE — PROGRESS NOTE ADULT - PROBLEM SELECTOR PLAN 1
s/p C2V/AVR(T) 6/16/22  Continue post-op care   Continue ASA 81, Atorvastatin 80 mg qhs, and Lopressor 50 mg BID   Continue diuresis with Lasix 20 mg PO BID & Aldactone 25 mg QD   Maintain meds x 2 to LWS - will re-evaluate drainage s/p ambulation and possibly d/c   Maintain PW VVI 50/8   Barber reinserted for retention on 6/18 - will maintain for now   Increase activity as tolerated, ambulate 4x daily and w/ PT   Chest PT, encourage incentive spirometry 10x/hr while awake   Pain control with Neurontin, Oxycodone, & Tylenol prn as per ERP protocol   GI ppx w/ Protonix, DVT ppx w/ Lovenox   Dispo: home w/ no PT needs when medically cleared s/p C2V/AVR(T) 6/16/22  Continue post-op care   Continue ASA 81, Atorvastatin 80 mg qhs, and Lopressor 50 mg BID   amio d/c secondary nausea this am  diuretics d/c bun increased to 47 today  pw isolated   Barber reinserted for retention on 6/18 - d/c in am for voiding trial   Increase activity as tolerated, ambulate 4x daily and w/ PT   Chest PT, encourage incentive spirometry 10x/hr while awake   Pain control with Neurontin, Oxycodone, & Tylenol prn as per ERP protocol   GI ppx w/ Protonix, DVT ppx w/ Lovenox   Dispo: home w/ no PT needs when medically cleared

## 2022-06-20 NOTE — PROGRESS NOTE ADULT - PROBLEM SELECTOR PLAN 2
s/p C2V/AVR(T) 6/16/22  Continue ASA 81, Atorvastatin 80 mg qhs, and Lopressor 50 mg BID   Continue diuresis with Lasix 20 mg PO BID & Aldactone 25 mg QD   Maintain meds x 2 to LWS - will re-evaluate drainage s/p ambulation and possibly d/c   Maintain PW VVI 50/8   Barber reinserted for retention on 6/18 - will maintain for now   Increase activity as tolerated, ambulate 4x daily and w/ PT   Chest PT, encourage incentive spirometry 10x/hr while awake   Pain control with Neurontin, Oxycodone, & Tylenol prn as per ERP protocol   GI ppx w/ Protonix, DVT ppx w/ Lovenox   Dispo: home w/ no PT needs when medically cleared Continue post-op care   Continue ASA 81, Atorvastatin 80 mg qhs, and Lopressor 50 mg BID   amio d/c secondary nausea this am  diuretics d/c bun increased to 47 today  pw isolated   Barber reinserted for retention on 6/18 - d/c in am for voiding trial   Increase activity as tolerated, ambulate 4x daily and w/ PT   Chest PT, encourage incentive spirometry 10x/hr while awake   Pain control with Neurontin, Oxycodone, & Tylenol prn as per ERP protocol   GI ppx w/ Protonix, DVT ppx w/ Lovenox   Dispo: home w/ no PT needs when medically cleared

## 2022-06-20 NOTE — PROGRESS NOTE ADULT - SUBJECTIVE AND OBJECTIVE BOX
VITAL SIGNS    Telemetry:    Vital Signs Last 24 Hrs  T(C): 36.8 (22 @ 04:49), Max: 36.8 (22 @ 04:49)  T(F): 98.3 (22 @ 04:49), Max: 98.3 (22 @ 04:49)  HR: 71 (22 @ 04:49) (63 - 73)  BP: 154/84 (22 @ 04:49) (113/88 - 154/84)  RR: 18 (22 @ 04:49) (18 - 18)  SpO2: 92% (22 @ 04:49) (92% - 94%)             @ 07:01  -   @ 07:00  --------------------------------------------------------  IN: 390 mL / OUT: 1450 mL / NET: -1060 mL     @ 07:01  -   @ 10:25  --------------------------------------------------------  IN: 240 mL / OUT: 0 mL / NET: 240 mL       Daily     Daily Weight in k.7 (2022 10:01)  Admit Wt: Drug Dosing Weight  Height (cm): 154.9 (2022 06:43)  Weight (kg): 58.7 (2022 06:43)  BMI (kg/m2): 24.5 (2022 06:43)  BSA (m2): 1.57 (2022 06:43)      CAPILLARY BLOOD GLUCOSE              acetaminophen     Tablet .. 650 milliGRAM(s) Oral every 6 hours PRN  ascorbic acid 500 milliGRAM(s) Oral two times a day  aspirin enteric coated 81 milliGRAM(s) Oral daily  atorvastatin 80 milliGRAM(s) Oral at bedtime  bisacodyl Suppository 10 milliGRAM(s) Rectal once  enoxaparin Injectable 40 milliGRAM(s) SubCutaneous every 24 hours  gabapentin 100 milliGRAM(s) Oral every 8 hours  metoprolol tartrate 50 milliGRAM(s) Oral two times a day  ondansetron Injectable 4 milliGRAM(s) IV Push every 8 hours  oxyCODONE    IR 5 milliGRAM(s) Oral every 4 hours PRN  oxyCODONE    IR 10 milliGRAM(s) Oral every 4 hours PRN  pantoprazole    Tablet 40 milliGRAM(s) Oral before breakfast  polyethylene glycol 3350 17 Gram(s) Oral daily  potassium bicarbonate/potassium citrate 25 milliEquivalent(s) Oral every 1 hour  senna 2 Tablet(s) Oral at bedtime  sodium chloride 0.9% lock flush 3 milliLiter(s) IV Push every 8 hours      PHYSICAL EXAM    Subjective: "Hi.   Neurology: alert and oriented x 3, nonfocal, no gross deficits  CV : tele:  RSR  Sternal Wound :  CDI with dressing , Stable  Lungs: clear. RR easy, unlabored   Abdomen: soft, nontender, nondistended, positive bowel sounds, bowel movement   Neg N/V/D   :  pt voiding without difficulty   Extremities:   BAZZI; edema, neg calf tenderness.   PPP bilaterally      PW:  Chest tubes:                 VITAL SIGNS    Telemetry:  rsr 60-70  Vital Signs Last 24 Hrs  T(C): 36.8 (22 @ 04:49), Max: 36.8 (22 @ 04:49)  T(F): 98.3 (22 @ 04:49), Max: 98.3 (22 @ 04:49)  HR: 71 (22 @ 04:49) (63 - 73)  BP: 154/84 (22 @ 04:49) (113/88 - 154/84)  RR: 18 (22 @ 04:49) (18 - 18)  SpO2: 92% (22 @ 04:49) (92% - 94%)             @ 07:01  -   @ 07:00  --------------------------------------------------------  IN: 390 mL / OUT: 1450 mL / NET: -1060 mL     @ 07:01  -  20 @ 10:25  --------------------------------------------------------  IN: 240 mL / OUT: 0 mL / NET: 240 mL       Daily     Daily Weight in k.7 (2022 10:01)  Admit Wt: Drug Dosing Weight  Height (cm): 154.9 (2022 06:43)  Weight (kg): 58.7 (2022 06:43)  BMI (kg/m2): 24.5 (2022 06:43)  BSA (m2): 1.57 (2022 06:43)          acetaminophen     Tablet .. 650 milliGRAM(s) Oral every 6 hours PRN  ascorbic acid 500 milliGRAM(s) Oral two times a day  aspirin enteric coated 81 milliGRAM(s) Oral daily  atorvastatin 80 milliGRAM(s) Oral at bedtime  bisacodyl Suppository 10 milliGRAM(s) Rectal once  enoxaparin Injectable 40 milliGRAM(s) SubCutaneous every 24 hours  gabapentin 100 milliGRAM(s) Oral every 8 hours  metoprolol tartrate 50 milliGRAM(s) Oral two times a day  ondansetron Injectable 4 milliGRAM(s) IV Push every 8 hours  oxyCODONE    IR 5 milliGRAM(s) Oral every 4 hours PRN  oxyCODONE    IR 10 milliGRAM(s) Oral every 4 hours PRN  pantoprazole    Tablet 40 milliGRAM(s) Oral before breakfast  polyethylene glycol 3350 17 Gram(s) Oral daily  potassium bicarbonate/potassium citrate 25 milliEquivalent(s) Oral every 1 hour  senna 2 Tablet(s) Oral at bedtime  sodium chloride 0.9% lock flush 3 milliLiter(s) IV Push every 8 hours      PHYSICAL EXAM    Subjective: "My nausea is a little bit better."   Neurology: alert and oriented x 3, nonfocal, no gross deficits  CV : tele:  RSR 60-70  Sternal Wound :  CDI DANAY- sternum stable; +pw isolated  Lungs: clear diminished at the bases; RR easy, unlabored   Abdomen: soft, nontender, nondistended, positive bowel sounds, + bowel movement   Neg N/V/D; obese abdomen   :  pt voiding without difficulty   Extremities:   BAZZI; trace LE edema, neg calf tenderness.   PPP bilaterally ; SVG site cdi danay     PW: + isolated  Chest tubes: none

## 2022-06-21 ENCOUNTER — TRANSCRIPTION ENCOUNTER (OUTPATIENT)
Age: 72
End: 2022-06-21

## 2022-06-21 LAB
ANION GAP SERPL CALC-SCNC: 10 MMOL/L — SIGNIFICANT CHANGE UP (ref 5–17)
BUN SERPL-MCNC: 31 MG/DL — HIGH (ref 7–23)
CALCIUM SERPL-MCNC: 8.6 MG/DL — SIGNIFICANT CHANGE UP (ref 8.4–10.5)
CHLORIDE SERPL-SCNC: 100 MMOL/L — SIGNIFICANT CHANGE UP (ref 96–108)
CO2 SERPL-SCNC: 27 MMOL/L — SIGNIFICANT CHANGE UP (ref 22–31)
CREAT SERPL-MCNC: 0.83 MG/DL — SIGNIFICANT CHANGE UP (ref 0.5–1.3)
EGFR: 74 ML/MIN/1.73M2 — SIGNIFICANT CHANGE UP
GLUCOSE SERPL-MCNC: 134 MG/DL — HIGH (ref 70–99)
HCT VFR BLD CALC: 27.8 % — LOW (ref 34.5–45)
HGB BLD-MCNC: 8.9 G/DL — LOW (ref 11.5–15.5)
MCHC RBC-ENTMCNC: 30.1 PG — SIGNIFICANT CHANGE UP (ref 27–34)
MCHC RBC-ENTMCNC: 32 GM/DL — SIGNIFICANT CHANGE UP (ref 32–36)
MCV RBC AUTO: 93.9 FL — SIGNIFICANT CHANGE UP (ref 80–100)
NRBC # BLD: 4 /100 WBCS — HIGH (ref 0–0)
PLATELET # BLD AUTO: 157 K/UL — SIGNIFICANT CHANGE UP (ref 150–400)
POTASSIUM SERPL-MCNC: 5 MMOL/L — SIGNIFICANT CHANGE UP (ref 3.5–5.3)
POTASSIUM SERPL-SCNC: 5 MMOL/L — SIGNIFICANT CHANGE UP (ref 3.5–5.3)
RBC # BLD: 2.96 M/UL — LOW (ref 3.8–5.2)
RBC # FLD: 15 % — HIGH (ref 10.3–14.5)
SODIUM SERPL-SCNC: 137 MMOL/L — SIGNIFICANT CHANGE UP (ref 135–145)
WBC # BLD: 10.34 K/UL — SIGNIFICANT CHANGE UP (ref 3.8–10.5)
WBC # FLD AUTO: 10.34 K/UL — SIGNIFICANT CHANGE UP (ref 3.8–10.5)

## 2022-06-21 PROCEDURE — 93306 TTE W/DOPPLER COMPLETE: CPT | Mod: 26

## 2022-06-21 RX ADMIN — ENOXAPARIN SODIUM 40 MILLIGRAM(S): 100 INJECTION SUBCUTANEOUS at 13:06

## 2022-06-21 RX ADMIN — GABAPENTIN 100 MILLIGRAM(S): 400 CAPSULE ORAL at 06:12

## 2022-06-21 RX ADMIN — SODIUM CHLORIDE 3 MILLILITER(S): 9 INJECTION INTRAMUSCULAR; INTRAVENOUS; SUBCUTANEOUS at 06:12

## 2022-06-21 RX ADMIN — Medication 500 MILLIGRAM(S): at 06:12

## 2022-06-21 RX ADMIN — Medication 50 MILLIGRAM(S): at 06:11

## 2022-06-21 RX ADMIN — SODIUM CHLORIDE 3 MILLILITER(S): 9 INJECTION INTRAMUSCULAR; INTRAVENOUS; SUBCUTANEOUS at 21:19

## 2022-06-21 RX ADMIN — SODIUM CHLORIDE 3 MILLILITER(S): 9 INJECTION INTRAMUSCULAR; INTRAVENOUS; SUBCUTANEOUS at 14:00

## 2022-06-21 RX ADMIN — PANTOPRAZOLE SODIUM 40 MILLIGRAM(S): 20 TABLET, DELAYED RELEASE ORAL at 06:12

## 2022-06-21 RX ADMIN — ATORVASTATIN CALCIUM 80 MILLIGRAM(S): 80 TABLET, FILM COATED ORAL at 21:17

## 2022-06-21 RX ADMIN — Medication 50 MILLIGRAM(S): at 17:12

## 2022-06-21 RX ADMIN — Medication 0.25 MILLIGRAM(S): at 06:11

## 2022-06-21 RX ADMIN — GABAPENTIN 100 MILLIGRAM(S): 400 CAPSULE ORAL at 13:06

## 2022-06-21 RX ADMIN — Medication 81 MILLIGRAM(S): at 11:45

## 2022-06-21 NOTE — DISCHARGE NOTE NURSING/CASE MANAGEMENT/SOCIAL WORK - NSDCPEFALRISK_GEN_ALL_CORE
For information on Fall & Injury Prevention, visit: [email or web address] OR  [email or web address] OR  https://www.cdc.gov/steadi/patient.html

## 2022-06-21 NOTE — PROGRESS NOTE ADULT - PROBLEM SELECTOR PLAN 1
s/p C2V/AVR(T) 6/16/22  Continue post-op care   Continue ASA 81, Atorvastatin 80 mg qhs, and Lopressor 50 mg BID   amio d/c secondary nausea this am  diuretics d/c bun increased to 47 today  pw isolated   Barber reinserted for retention on 6/18 - d/c in am for voiding trial   Increase activity as tolerated, ambulate 4x daily and w/ PT   Chest PT, encourage incentive spirometry 10x/hr while awake   Pain control with Neurontin, Oxycodone, & Tylenol prn as per ERP protocol   GI ppx w/ Protonix, DVT ppx w/ Lovenox   Dispo: home w/ no PT needs when medically cleared Continue post-op care   Continue ASA 81, Atorvastatin 80 mg qhs, and Lopressor 50 mg BID   amio d/c secondary nausea 6/20  diuretics d/c 6/20-- bun decrease to 31 today   pw isolated -d/c in am 6/22  ck echo today   Barber reinserted for retention on 6/18 - d/c today for voiding trial   Increase activity as tolerated, ambulate 4x daily and w/ PT   Chest PT, encourage incentive spirometry 10x/hr while awake   Pain control with Neurontin, Oxycodone, & Tylenol prn as per ERP protocol   GI ppx w/ Protonix, DVT ppx w/ Lovenox   Dispo: home w/ no PT needs thur/ fri if stable

## 2022-06-21 NOTE — PROGRESS NOTE ADULT - PROBLEM SELECTOR PLAN 2
Continue post-op care   Continue ASA 81, Atorvastatin 80 mg qhs, and Lopressor 50 mg BID   amio d/c secondary nausea this am  diuretics d/c bun increased to 47 today  pw isolated   Barber reinserted for retention on 6/18 - d/c in am for voiding trial   Increase activity as tolerated, ambulate 4x daily and w/ PT   Chest PT, encourage incentive spirometry 10x/hr while awake   Pain control with Neurontin, Oxycodone, & Tylenol prn as per ERP protocol   GI ppx w/ Protonix, DVT ppx w/ Lovenox   Dispo: home w/ no PT needs when medically cleared Continue post-op care   Continue ASA 81, Atorvastatin 80 mg qhs, and Lopressor 50 mg BID   amio d/c secondary nausea 6/20  diuretics d/c 6/20-- bun decrease to 31 today   pw isolated -d/c in am 6/22  Barber reinserted for retention on 6/18 - d/c today for voiding trial   Increase activity as tolerated, ambulate 4x daily and w/ PT   Chest PT, encourage incentive spirometry 10x/hr while awake   Pain control with Neurontin, Oxycodone, & Tylenol prn as per ERP protocol   GI ppx w/ Protonix, DVT ppx w/ Lovenox   Dispo: home w/ no PT needs thur/ fri if stable

## 2022-06-21 NOTE — PROGRESS NOTE ADULT - PROBLEM SELECTOR PLAN 4
Pt with 7 hrs afib from 00:30 --> 07:30 on 6/19   Pt now SR 60s-70s   Continue Lopressor 50 mg BID   Continue Amio load initiated 6/19 --> d/c this am secondary to nausea  pw isolated   Check lytes daily and replete prn, K > 4, Mg > 2 Pt with 7 hrs afib from 00:30 --> 07:30 on 6/19   Pt now SR 60-80  Continue Lopressor 50 mg BID - titrate if needed to q8 for hr control  Continue Amio load initiated 6/19 --> d/c secondary to nausea on 6/21- nausea improved today   pw isolated -d/c in am 6/22 if HR stable   Check lytes daily and replete prn, K > 4, Mg > 2

## 2022-06-21 NOTE — PROGRESS NOTE ADULT - ASSESSMENT
This is a 70 y/o female PMH CAD, severe aortic stenosis, was found to have TVD on cardiac catheterization performed at [facility] in April, denies dyspnea, angina or syncope.  Presents today for aortic valve replacement and CABG X 3.      Pt s/p C2V, AVR (t) with Dr. Chang on 6/16.    6/16: C2V, AVR (t) with Dr. Chang, Meds x 2, +PW, intraop 3u PBRC, 1 platelet, 2 FFP, RIJ intro  6/18: transferred to floor, Meds to LWS, PW 50/8, due to void at 10pm  6/19 VSS, NAD. Pt with 7 hrs afib 110s-130s overnight - Lopressor increased to 50 mg BID, Lopressor 2.5 mg IV x 1/Mg given, PO amio load initiated - then converted to SR 60s-70s at 07:30. Sm R PTX on CXR yesterday - stable on today's CXR. Med CT x 2 with 60cc output overnight, 300cc in 24 hrs - will check drainage s/p ambulation and possibly d/c this afternoon if minimal. Luevano re-inserted overnight for retention, will maintain for now. WBC trending up 8 -->13, MSI and b/l SVG sites CDI with no erythema or drainage noted. UA sent - small leuk esterase, no WBC/bacteria/nitrites, will f/u UCx.   6/20 VSS; RSR 60-70; amio d/c as per Dr. Chang secondary to nausea; bun increased to 47 today--> diuretics d/c; await urine cx; wbc down to 11 today; d/c luevano in am for voiding trial  supplement hypokalemia ; Pw isolated   Dispo: home w/ no PT when medically cleared This is a 70 y/o female PMH CAD, severe aortic stenosis, was found to have TVD on cardiac catheterization performed at [facility] in April, denies dyspnea, angina or syncope.  Presents today for aortic valve replacement and CABG X 3.      Pt s/p C2V, AVR (t) with Dr. Chang on 6/16.    6/16: C2V, AVR (t) with Dr. Chang, Meds x 2, +PW, intraop 3u PBRC, 1 platelet, 2 FFP, RIJ intro  6/18: transferred to floor, Meds to LWS, PW 50/8, due to void at 10pm  6/19 VSS, NAD. Pt with 7 hrs afib 110s-130s overnight - Lopressor increased to 50 mg BID, Lopressor 2.5 mg IV x 1/Mg given, PO amio load initiated - then converted to SR 60s-70s at 07:30. Sm R PTX on CXR yesterday - stable on today's CXR. Med CT x 2 with 60cc output overnight, 300cc in 24 hrs - will check drainage s/p ambulation and possibly d/c this afternoon if minimal. Luevano re-inserted overnight for retention, will maintain for now. WBC trending up 8 -->13, MSI and b/l SVG sites CDI with no erythema or drainage noted. UA sent - small leuk esterase, no WBC/bacteria/nitrites, will f/u UCx.   6/20 VSS; RSR 60-70; amio d/c as per Dr. Chang secondary to nausea; bun increased to 47 today--> diuretics d/c; await urine cx; wbc down to 11 today; d/c luevano in am for voiding trial  supplement hypokalemia ; Pw isolated   6/21 VSS;   Dispo: home w/ no PT when medically cleared This is a 72 y/o female PMH CAD, severe aortic stenosis, was found to have TVD on cardiac catheterization performed at [facility] in April, denies dyspnea, angina or syncope.  Presents today for aortic valve replacement and CABG X 3.      Pt s/p C2V, AVR (t) with Dr. Chang on 6/16.    6/16: C2V, AVR (t) with Dr. Chang, Meds x 2, +PW, intraop 3u PBRC, 1 platelet, 2 FFP, RIJ intro  6/18: transferred to floor, Meds to LWS, PW 50/8, due to void at 10pm  6/19 VSS, NAD. Pt with 7 hrs afib 110s-130s overnight - Lopressor increased to 50 mg BID, Lopressor 2.5 mg IV x 1/Mg given, PO amio load initiated - then converted to SR 60s-70s at 07:30. Sm R PTX on CXR yesterday - stable on today's CXR. Med CT x 2 with 60cc output overnight, 300cc in 24 hrs - will check drainage s/p ambulation and possibly d/c this afternoon if minimal. Luevano re-inserted overnight for retention, will maintain for now. WBC trending up 8 -->13, MSI and b/l SVG sites CDI with no erythema or drainage noted. UA sent - small leuk esterase, no WBC/bacteria/nitrites, will f/u UCx.   6/20 VSS; RSR 60-70; amio d/c as per Dr. Chang secondary to nausea; bun increased to 47 today--> diuretics d/c; await urine cx; wbc down to 11 today; d/c luevano in am for voiding trial  supplement hypokalemia ; Pw isolated   6/21 VSS; continue lop 50 bid- titrate if needed to q8 for hr control; nausea improved; ck routine echo today; increase activity as tolerated; luevano d/c- pt dtv; bun decreased to 31 today   d/w pw in am if HR stable; urine cx neg   discharge planning- home thur/ fri as per DR. Chang

## 2022-06-21 NOTE — DISCHARGE NOTE NURSING/CASE MANAGEMENT/SOCIAL WORK - PATIENT PORTAL LINK FT
You can access the FollowMyHealth Patient Portal offered by [facility] by registering at the following website: [email or web address]. By joining [facility]’s FollowMyHealth portal, you will also be able to view your health information using other applications (apps) compatible with our system.

## 2022-06-22 LAB
ANION GAP SERPL CALC-SCNC: 10 MMOL/L — SIGNIFICANT CHANGE UP (ref 5–17)
BUN SERPL-MCNC: 17 MG/DL — SIGNIFICANT CHANGE UP (ref 7–23)
CALCIUM SERPL-MCNC: 8.4 MG/DL — SIGNIFICANT CHANGE UP (ref 8.4–10.5)
CHLORIDE SERPL-SCNC: 100 MMOL/L — SIGNIFICANT CHANGE UP (ref 96–108)
CO2 SERPL-SCNC: 25 MMOL/L — SIGNIFICANT CHANGE UP (ref 22–31)
CREAT SERPL-MCNC: 0.66 MG/DL — SIGNIFICANT CHANGE UP (ref 0.5–1.3)
EGFR: 93 ML/MIN/1.73M2 — SIGNIFICANT CHANGE UP
GLUCOSE SERPL-MCNC: 120 MG/DL — HIGH (ref 70–99)
HCT VFR BLD CALC: 26.1 % — LOW (ref 34.5–45)
HGB BLD-MCNC: 8.6 G/DL — LOW (ref 11.5–15.5)
MCHC RBC-ENTMCNC: 30.5 PG — SIGNIFICANT CHANGE UP (ref 27–34)
MCHC RBC-ENTMCNC: 33 GM/DL — SIGNIFICANT CHANGE UP (ref 32–36)
MCV RBC AUTO: 92.6 FL — SIGNIFICANT CHANGE UP (ref 80–100)
NRBC # BLD: 2 /100 WBCS — HIGH (ref 0–0)
PLATELET # BLD AUTO: 162 K/UL — SIGNIFICANT CHANGE UP (ref 150–400)
POTASSIUM SERPL-MCNC: 4.1 MMOL/L — SIGNIFICANT CHANGE UP (ref 3.5–5.3)
POTASSIUM SERPL-SCNC: 4.1 MMOL/L — SIGNIFICANT CHANGE UP (ref 3.5–5.3)
RBC # BLD: 2.82 M/UL — LOW (ref 3.8–5.2)
RBC # FLD: 15.2 % — HIGH (ref 10.3–14.5)
SODIUM SERPL-SCNC: 135 MMOL/L — SIGNIFICANT CHANGE UP (ref 135–145)
WBC # BLD: 8.84 K/UL — SIGNIFICANT CHANGE UP (ref 3.8–10.5)
WBC # FLD AUTO: 8.84 K/UL — SIGNIFICANT CHANGE UP (ref 3.8–10.5)

## 2022-06-22 RX ORDER — METOPROLOL TARTRATE 50 MG
25 TABLET ORAL ONCE
Refills: 0 | Status: COMPLETED | OUTPATIENT
Start: 2022-06-22 | End: 2022-06-22

## 2022-06-22 RX ORDER — METOPROLOL TARTRATE 50 MG
100 TABLET ORAL EVERY 12 HOURS
Refills: 0 | Status: DISCONTINUED | OUTPATIENT
Start: 2022-06-22 | End: 2022-06-23

## 2022-06-22 RX ORDER — METOPROLOL TARTRATE 50 MG
75 TABLET ORAL EVERY 12 HOURS
Refills: 0 | Status: DISCONTINUED | OUTPATIENT
Start: 2022-06-22 | End: 2022-06-22

## 2022-06-22 RX ADMIN — SODIUM CHLORIDE 3 MILLILITER(S): 9 INJECTION INTRAMUSCULAR; INTRAVENOUS; SUBCUTANEOUS at 05:15

## 2022-06-22 RX ADMIN — SODIUM CHLORIDE 3 MILLILITER(S): 9 INJECTION INTRAMUSCULAR; INTRAVENOUS; SUBCUTANEOUS at 13:36

## 2022-06-22 RX ADMIN — POLYETHYLENE GLYCOL 3350 17 GRAM(S): 17 POWDER, FOR SOLUTION ORAL at 13:10

## 2022-06-22 RX ADMIN — SENNA PLUS 2 TABLET(S): 8.6 TABLET ORAL at 21:46

## 2022-06-22 RX ADMIN — PANTOPRAZOLE SODIUM 40 MILLIGRAM(S): 20 TABLET, DELAYED RELEASE ORAL at 07:31

## 2022-06-22 RX ADMIN — ENOXAPARIN SODIUM 40 MILLIGRAM(S): 100 INJECTION SUBCUTANEOUS at 13:10

## 2022-06-22 RX ADMIN — Medication 25 MILLIGRAM(S): at 10:41

## 2022-06-22 RX ADMIN — ATORVASTATIN CALCIUM 80 MILLIGRAM(S): 80 TABLET, FILM COATED ORAL at 21:45

## 2022-06-22 RX ADMIN — Medication 100 MILLIGRAM(S): at 17:19

## 2022-06-22 RX ADMIN — Medication 50 MILLIGRAM(S): at 06:31

## 2022-06-22 RX ADMIN — SODIUM CHLORIDE 3 MILLILITER(S): 9 INJECTION INTRAMUSCULAR; INTRAVENOUS; SUBCUTANEOUS at 21:43

## 2022-06-22 RX ADMIN — Medication 81 MILLIGRAM(S): at 13:10

## 2022-06-22 NOTE — PROGRESS NOTE ADULT - PROBLEM SELECTOR PLAN 2
Continue post-op care   Continue ASA 81, Atorvastatin 80 mg qhs, and inc Lopressor 75 mg BID   amio d/c secondary nausea 6/20   pw isolated -d/c in am 6/23  Increase activity as tolerated, ambulate 4x daily and w/ PT   Chest PT, encourage incentive spirometry 10x/hr while awake   Pain control with Neurontin, Oxycodone, & Tylenol prn as per ERP protocol   GI ppx w/ Protonix, DVT ppx w/ Lovenox   Dispo: home w/ no PT needs thur if stable

## 2022-06-22 NOTE — PROGRESS NOTE ADULT - SUBJECTIVE AND OBJECTIVE BOX
S:  feels tired    Telemetry:  SR 60-80    Vital Signs Last 24 Hrs  T(C): 37 (22 @ 04:05), Max: 37 (22 @ 04:05)  T(F): 98.6 (22 @ 04:05), Max: 98.6 (22 @ 04:05)  HR: 67 (22 @ 07:36) (65 - 83)  BP: 166/90 (22 @ 07:36) (129/75 - 180/85)  RR: 17 (22 @ 07:36) (17 - 18)  SpO2: 94% (22 @ 07:36) (94% - 95%)                    @ 07:01  -   @ 07:00  --------------------------------------------------------  IN: 460 mL / OUT: 1575 mL / NET: -1115 mL     @ 07:01  -   @ 11:26  --------------------------------------------------------  IN: 240 mL / OUT: 400 mL / NET: -160 mL      Daily Weight in k.9 (2022 07:54              Drains:     MS         [  ] Drainage:                 L Pleural  [  ]  Drainage:                R Pleural  [  ]  Drainage:    Pacing Wires        [  ]   Settings:                                  Isolated  [ x ]    Coumadin    [ ] YES          [ x ]      NO         Reason:                                 8.6    8.84  )-----------( 162      ( 2022 05:48 )             26.1           135  |  100  |  17  ----------------------------<  120<H>  4.1   |  25  |  0.66    Ca    8.4      2022 05:48            PHYSICAL EXAM:    Neurology: alert and oriented x 3, nonfocal, no gross deficits    CV :  S1S2 heard RRR    Sternal Wound :  CDI , Stable    Lungs:  clear to ausc.  no w/r/r    Abdomen: soft, nontender, nondistended, positive bowel sounds, + bowel movement          Extremities:     no edema          acetaminophen     Tablet .. 650 milliGRAM(s) Oral every 6 hours PRN  ALPRAZolam 0.25 milliGRAM(s) Oral every 8 hours PRN  aspirin enteric coated 81 milliGRAM(s) Oral daily  atorvastatin 80 milliGRAM(s) Oral at bedtime  bisacodyl Suppository 10 milliGRAM(s) Rectal once  enoxaparin Injectable 40 milliGRAM(s) SubCutaneous every 24 hours  metoprolol tartrate 75 milliGRAM(s) Oral every 12 hours  oxyCODONE    IR 5 milliGRAM(s) Oral every 4 hours PRN  oxyCODONE    IR 10 milliGRAM(s) Oral every 4 hours PRN  pantoprazole    Tablet 40 milliGRAM(s) Oral before breakfast  polyethylene glycol 3350 17 Gram(s) Oral daily  senna 2 Tablet(s) Oral at bedtime  sodium chloride 0.9% lock flush 3 milliLiter(s) IV Push every 8 hours                              Physical Therapy Rec:   Home  [ x   Home w/ PT  [  ]  Rehab  [  ]    Discussed with Cardiothoracic Team at AM rounds.

## 2022-06-22 NOTE — PROGRESS NOTE ADULT - PROBLEM SELECTOR PLAN 1
Continue post-op care   Continue ASA 81, Atorvastatin 80 mg qhs, and increased to Lopressor 75 mg BID   amio d/c secondary nausea 6/20  pw isolated -d/c in am 6/23  Increase activity as tolerated, ambulate 4x daily and w/ PT   Chest PT, encourage incentive spirometry 10x/hr while awake   Pain control with Neurontin, Oxycodone, & Tylenol prn as per ERP protocol   GI ppx w/ Protonix, DVT ppx w/ Lovenox   Dispo: home w/ no PT needs thur if stable

## 2022-06-22 NOTE — PROGRESS NOTE ADULT - ASSESSMENT
This is a 72 y/o female PMH CAD, severe aortic stenosis, was found to have TVD on cardiac catheterization performed at [facility] in April, denies dyspnea, angina or syncope.  Presents today for aortic valve replacement and CABG X 3.      Pt s/p C2V, AVR (t) with Dr. Chang on 6/16.    6/16: C2V, AVR (t) with Dr. Chang, Meds x 2, +PW, intraop 3u PBRC, 1 platelet, 2 FFP, RIJ intro  6/18: transferred to floor, Meds to LWS, PW 50/8, due to void at 10pm  6/19 VSS, NAD. Pt with 7 hrs afib 110s-130s overnight - Lopressor increased to 50 mg BID, Lopressor 2.5 mg IV x 1/Mg given, PO amio load initiated - then converted to SR 60s-70s at 07:30. Sm R PTX on CXR yesterday - stable on today's CXR. Med CT x 2 with 60cc output overnight, 300cc in 24 hrs - will check drainage s/p ambulation and possibly d/c this afternoon if minimal. Luevano re-inserted overnight for retention, will maintain for now. WBC trending up 8 -->13, MSI and b/l SVG sites CDI with no erythema or drainage noted. UA sent - small leuk esterase, no WBC/bacteria/nitrites, will f/u UCx.   6/20 VSS; RSR 60-70; amio d/c as per Dr. Chang secondary to nausea; bun increased to 47 today--> diuretics d/c; await urine cx; wbc down to 11 today; d/c luevano in am for voiding trial  supplement hypokalemia ; Pw isolated   6/21 VSS; continue lop 50 bid- titrate if needed to q8 for hr control; nausea improved; ck routine echo today; increase activity as tolerated; luevano d/c- pt dtv; bun decreased to 31 today   d/w pw in am if HR stable; urine cx neg discharge planning- home thur/ fri as per DR. Chang   6/22 /90  BB increased -- +PW.  Home Thurs

## 2022-06-22 NOTE — PROGRESS NOTE ADULT - PROBLEM SELECTOR PLAN 4
Pt with 7 hrs afib from 00:30 --> 07:30 on 6/19   Pt now SR 60-80  Increase Lopressor 75 mg BID   Continue Amio load initiated 6/19 --> d/c secondary to nausea on 6/21- nausea improved today   pw isolated -d/c in am 6/23 if HR stable   Check lytes daily and replete prn, K > 4, Mg > 2

## 2022-06-23 ENCOUNTER — TRANSCRIPTION ENCOUNTER (OUTPATIENT)
Age: 72
End: 2022-06-23

## 2022-06-23 VITALS
DIASTOLIC BLOOD PRESSURE: 80 MMHG | SYSTOLIC BLOOD PRESSURE: 147 MMHG | TEMPERATURE: 98 F | OXYGEN SATURATION: 96 % | RESPIRATION RATE: 18 BRPM | HEART RATE: 94 BPM

## 2022-06-23 LAB
ANION GAP SERPL CALC-SCNC: 12 MMOL/L — SIGNIFICANT CHANGE UP (ref 5–17)
BUN SERPL-MCNC: 12 MG/DL — SIGNIFICANT CHANGE UP (ref 7–23)
CALCIUM SERPL-MCNC: 8.8 MG/DL — SIGNIFICANT CHANGE UP (ref 8.4–10.5)
CHLORIDE SERPL-SCNC: 100 MMOL/L — SIGNIFICANT CHANGE UP (ref 96–108)
CO2 SERPL-SCNC: 25 MMOL/L — SIGNIFICANT CHANGE UP (ref 22–31)
CREAT SERPL-MCNC: 0.6 MG/DL — SIGNIFICANT CHANGE UP (ref 0.5–1.3)
EGFR: 95 ML/MIN/1.73M2 — SIGNIFICANT CHANGE UP
GLUCOSE SERPL-MCNC: 117 MG/DL — HIGH (ref 70–99)
HCT VFR BLD CALC: 28.5 % — LOW (ref 34.5–45)
HGB BLD-MCNC: 9.4 G/DL — LOW (ref 11.5–15.5)
MCHC RBC-ENTMCNC: 30.5 PG — SIGNIFICANT CHANGE UP (ref 27–34)
MCHC RBC-ENTMCNC: 33 GM/DL — SIGNIFICANT CHANGE UP (ref 32–36)
MCV RBC AUTO: 92.5 FL — SIGNIFICANT CHANGE UP (ref 80–100)
NRBC # BLD: 0 /100 WBCS — SIGNIFICANT CHANGE UP (ref 0–0)
PLATELET # BLD AUTO: 185 K/UL — SIGNIFICANT CHANGE UP (ref 150–400)
POTASSIUM SERPL-MCNC: 3.9 MMOL/L — SIGNIFICANT CHANGE UP (ref 3.5–5.3)
POTASSIUM SERPL-SCNC: 3.9 MMOL/L — SIGNIFICANT CHANGE UP (ref 3.5–5.3)
RBC # BLD: 3.08 M/UL — LOW (ref 3.8–5.2)
RBC # FLD: 15.6 % — HIGH (ref 10.3–14.5)
SODIUM SERPL-SCNC: 137 MMOL/L — SIGNIFICANT CHANGE UP (ref 135–145)
WBC # BLD: 11.85 K/UL — HIGH (ref 3.8–10.5)
WBC # FLD AUTO: 11.85 K/UL — HIGH (ref 3.8–10.5)

## 2022-06-23 PROCEDURE — 82962 GLUCOSE BLOOD TEST: CPT

## 2022-06-23 PROCEDURE — 85018 HEMOGLOBIN: CPT

## 2022-06-23 PROCEDURE — C8929: CPT

## 2022-06-23 PROCEDURE — 84484 ASSAY OF TROPONIN QUANT: CPT

## 2022-06-23 PROCEDURE — 87086 URINE CULTURE/COLONY COUNT: CPT

## 2022-06-23 PROCEDURE — 97116 GAIT TRAINING THERAPY: CPT

## 2022-06-23 PROCEDURE — 82803 BLOOD GASES ANY COMBINATION: CPT

## 2022-06-23 PROCEDURE — P9016: CPT

## 2022-06-23 PROCEDURE — 94002 VENT MGMT INPAT INIT DAY: CPT

## 2022-06-23 PROCEDURE — C1769: CPT

## 2022-06-23 PROCEDURE — 82550 ASSAY OF CK (CPK): CPT

## 2022-06-23 PROCEDURE — 82947 ASSAY GLUCOSE BLOOD QUANT: CPT

## 2022-06-23 PROCEDURE — 86985 SPLIT BLOOD OR PRODUCTS: CPT

## 2022-06-23 PROCEDURE — 71045 X-RAY EXAM CHEST 1 VIEW: CPT

## 2022-06-23 PROCEDURE — 82435 ASSAY OF BLOOD CHLORIDE: CPT

## 2022-06-23 PROCEDURE — 84443 ASSAY THYROID STIM HORMONE: CPT

## 2022-06-23 PROCEDURE — 84100 ASSAY OF PHOSPHORUS: CPT

## 2022-06-23 PROCEDURE — 85610 PROTHROMBIN TIME: CPT

## 2022-06-23 PROCEDURE — 82330 ASSAY OF CALCIUM: CPT

## 2022-06-23 PROCEDURE — 97165 OT EVAL LOW COMPLEX 30 MIN: CPT

## 2022-06-23 PROCEDURE — 80053 COMPREHEN METABOLIC PANEL: CPT

## 2022-06-23 PROCEDURE — P9073: CPT

## 2022-06-23 PROCEDURE — 36430 TRANSFUSION BLD/BLD COMPNT: CPT

## 2022-06-23 PROCEDURE — 86891 AUTOLOGOUS BLOOD OP SALVAGE: CPT

## 2022-06-23 PROCEDURE — P9045: CPT

## 2022-06-23 PROCEDURE — 82565 ASSAY OF CREATININE: CPT

## 2022-06-23 PROCEDURE — 86900 BLOOD TYPING SEROLOGIC ABO: CPT

## 2022-06-23 PROCEDURE — P9041: CPT

## 2022-06-23 PROCEDURE — 88305 TISSUE EXAM BY PATHOLOGIST: CPT

## 2022-06-23 PROCEDURE — C9399: CPT

## 2022-06-23 PROCEDURE — 86965 POOLING BLOOD PLATELETS: CPT

## 2022-06-23 PROCEDURE — 86901 BLOOD TYPING SEROLOGIC RH(D): CPT

## 2022-06-23 PROCEDURE — 85027 COMPLETE CBC AUTOMATED: CPT

## 2022-06-23 PROCEDURE — 85014 HEMATOCRIT: CPT

## 2022-06-23 PROCEDURE — 93005 ELECTROCARDIOGRAM TRACING: CPT

## 2022-06-23 PROCEDURE — 80076 HEPATIC FUNCTION PANEL: CPT

## 2022-06-23 PROCEDURE — 83735 ASSAY OF MAGNESIUM: CPT

## 2022-06-23 PROCEDURE — 83605 ASSAY OF LACTIC ACID: CPT

## 2022-06-23 PROCEDURE — C1751: CPT

## 2022-06-23 PROCEDURE — 86022 PLATELET ANTIBODIES: CPT

## 2022-06-23 PROCEDURE — P9012: CPT

## 2022-06-23 PROCEDURE — 85730 THROMBOPLASTIN TIME PARTIAL: CPT

## 2022-06-23 PROCEDURE — P9059: CPT

## 2022-06-23 PROCEDURE — 84295 ASSAY OF SERUM SODIUM: CPT

## 2022-06-23 PROCEDURE — C1889: CPT

## 2022-06-23 PROCEDURE — 80048 BASIC METABOLIC PNL TOTAL CA: CPT

## 2022-06-23 PROCEDURE — 86923 COMPATIBILITY TEST ELECTRIC: CPT

## 2022-06-23 PROCEDURE — 85384 FIBRINOGEN ACTIVITY: CPT

## 2022-06-23 PROCEDURE — 85025 COMPLETE CBC W/AUTO DIFF WBC: CPT

## 2022-06-23 PROCEDURE — P9011: CPT

## 2022-06-23 PROCEDURE — P9047: CPT

## 2022-06-23 PROCEDURE — 81001 URINALYSIS AUTO W/SCOPE: CPT

## 2022-06-23 PROCEDURE — 84132 ASSAY OF SERUM POTASSIUM: CPT

## 2022-06-23 PROCEDURE — 97530 THERAPEUTIC ACTIVITIES: CPT

## 2022-06-23 PROCEDURE — 97162 PT EVAL MOD COMPLEX 30 MIN: CPT

## 2022-06-23 PROCEDURE — 86850 RBC ANTIBODY SCREEN: CPT

## 2022-06-23 PROCEDURE — 36415 COLL VENOUS BLD VENIPUNCTURE: CPT

## 2022-06-23 PROCEDURE — 82553 CREATINE MB FRACTION: CPT

## 2022-06-23 RX ORDER — METOPROLOL TARTRATE 50 MG
1 TABLET ORAL
Qty: 0 | Refills: 0 | DISCHARGE

## 2022-06-23 RX ORDER — OXYCODONE HYDROCHLORIDE 5 MG/1
1 TABLET ORAL
Qty: 10 | Refills: 0
Start: 2022-06-23 | End: 2022-06-27

## 2022-06-23 RX ORDER — ASCORBIC ACID 60 MG
1 TABLET,CHEWABLE ORAL
Qty: 0 | Refills: 0 | DISCHARGE

## 2022-06-23 RX ORDER — CHOLECALCIFEROL (VITAMIN D3) 125 MCG
1 CAPSULE ORAL
Qty: 0 | Refills: 0 | DISCHARGE

## 2022-06-23 RX ORDER — PANTOPRAZOLE SODIUM 20 MG/1
1 TABLET, DELAYED RELEASE ORAL
Qty: 14 | Refills: 0
Start: 2022-06-23 | End: 2022-07-06

## 2022-06-23 RX ORDER — METOPROLOL TARTRATE 50 MG
1 TABLET ORAL
Qty: 60 | Refills: 0
Start: 2022-06-23 | End: 2022-07-22

## 2022-06-23 RX ORDER — SENNA PLUS 8.6 MG/1
2 TABLET ORAL
Qty: 30 | Refills: 0
Start: 2022-06-23

## 2022-06-23 RX ORDER — ATORVASTATIN CALCIUM 80 MG/1
1 TABLET, FILM COATED ORAL
Qty: 0 | Refills: 0 | DISCHARGE

## 2022-06-23 RX ORDER — CALCIUM CARBONATE 500(1250)
1 TABLET ORAL
Qty: 0 | Refills: 0 | DISCHARGE

## 2022-06-23 RX ORDER — UBIDECARENONE 100 MG
1 CAPSULE ORAL
Qty: 0 | Refills: 0 | DISCHARGE

## 2022-06-23 RX ORDER — ASPIRIN/CALCIUM CARB/MAGNESIUM 324 MG
1 TABLET ORAL
Qty: 30 | Refills: 0
Start: 2022-06-23 | End: 2022-07-22

## 2022-06-23 RX ORDER — IRBESARTAN 75 MG/1
1 TABLET ORAL
Qty: 0 | Refills: 0 | DISCHARGE

## 2022-06-23 RX ADMIN — ENOXAPARIN SODIUM 40 MILLIGRAM(S): 100 INJECTION SUBCUTANEOUS at 13:45

## 2022-06-23 RX ADMIN — SODIUM CHLORIDE 3 MILLILITER(S): 9 INJECTION INTRAMUSCULAR; INTRAVENOUS; SUBCUTANEOUS at 05:06

## 2022-06-23 RX ADMIN — Medication 100 MILLIGRAM(S): at 17:45

## 2022-06-23 RX ADMIN — PANTOPRAZOLE SODIUM 40 MILLIGRAM(S): 20 TABLET, DELAYED RELEASE ORAL at 06:51

## 2022-06-23 RX ADMIN — Medication 100 MILLIGRAM(S): at 07:17

## 2022-06-23 RX ADMIN — Medication 81 MILLIGRAM(S): at 11:07

## 2022-06-23 RX ADMIN — SODIUM CHLORIDE 3 MILLILITER(S): 9 INJECTION INTRAMUSCULAR; INTRAVENOUS; SUBCUTANEOUS at 14:56

## 2022-06-23 NOTE — DISCHARGE NOTE PROVIDER - HOSPITAL COURSE
This is a 70 y/o female PMH CAD, severe aortic stenosis, was found to have TVD on cardiac catheterization performed at [facility] in April, denies dyspnea, angina or syncope.  Presents today for aortic valve replacement and CABG X 3.      Pt s/p C2V, AVR (t) with Dr. Chang on 6/16.    6/16: C2V, AVR (t) with Dr. Chang, Meds x 2, +PW, intraop 3u PBRC, 1 platelet, 2 FFP, RIJ intro  6/18: transferred to floor, Meds to LWS, PW 50/8, due to void at 10pm  6/19 VSS, NAD. Pt with 7 hrs afib 110s-130s overnight - Lopressor increased to 50 mg BID, Lopressor 2.5 mg IV x 1/Mg given, PO amio load initiated - then converted to SR 60s-70s at 07:30. Sm R PTX on CXR yesterday - stable on today's CXR. Med CT x 2 with 60cc output overnight, 300cc in 24 hrs - will check drainage s/p ambulation and possibly d/c this afternoon if minimal. Luevano re-inserted overnight for retention, will maintain for now. WBC trending up 8 -->13, MSI and b/l SVG sites CDI with no erythema or drainage noted. UA sent - small leuk esterase, no WBC/bacteria/nitrites, will f/u UCx.   6/20 VSS; RSR 60-70; amio d/c as per Dr. Chang secondary to nausea; bun increased to 47 today--> diuretics d/c; await urine cx; wbc down to 11 today; d/c luevano in am for voiding trial  supplement hypokalemia ; Pw isolated   6/21 VSS; continue lop 50 bid- titrate if needed to q8 for hr control; nausea improved; ck routine echo today; increase activity as tolerated; luevano d/c- pt dtv; bun decreased to 31 today   d/w pw in am if HR stable; urine cx neg discharge planning- home thur/ fri as per DR. Chang   6/22 /90  BB increased -- +PW.  Home Thurs  6/23 D/C PW.   D/c planning for today This is a 70 y/o female PMH CAD, severe aortic stenosis, was found to have TVD on cardiac catheterization performed at [facility] in April, denies dyspnea, angina or syncope.  Presents today for aortic valve replacement and CABG X 3.      Pt s/p C2V, AVR (t) with Dr. Chang on 6/16.    6/16: C2V, AVR (t) with Dr. Chang, Meds x 2, +PW, intraop 3u PBRC, 1 platelet, 2 FFP, RIJ intro  6/18: transferred to floor, Meds to LWS, PW 50/8, due to void at 10pm  6/19 VSS, NAD. Pt with 7 hrs afib 110s-130s overnight - Lopressor increased to 50 mg BID, Lopressor 2.5 mg IV x 1/Mg given, PO amio load initiated - then converted to SR 60s-70s at 07:30. Sm R PTX on CXR yesterday - stable on today's CXR. Med CT x 2 with 60cc output overnight, 300cc in 24 hrs - will check drainage s/p ambulation and possibly d/c this afternoon if minimal. Luevano re-inserted overnight for retention, will maintain for now. WBC trending up 8 -->13, MSI and b/l SVG sites CDI with no erythema or drainage noted. UA sent - small leuk esterase, no WBC/bacteria/nitrites, will f/u UCx.   6/20 VSS; RSR 60-70; amio d/c as per Dr. Chang secondary to nausea; bun increased to 47 today--> diuretics d/c; await urine cx; wbc down to 11 today; d/c luevano in am for voiding trial  supplement hypokalemia ; Pw isolated   6/21 VSS; continue lop 50 bid- titrate if needed to q8 for hr control; nausea improved; ck routine echo today; increase activity as tolerated; luevano d/c- pt dtv; bun decreased to 31 today   d/w pw in am if HR stable; urine cx neg discharge planning- home thur/ fri as per DR. Chang   6/22 /90  BB increased -- +PW.  Home Thurs  6/23 D/C PW.   D/c planning for today  Lft's mild elevation statin d/c

## 2022-06-23 NOTE — PROGRESS NOTE ADULT - PROBLEM SELECTOR PLAN 3
Continue Lopressor 50 mg BID   Will add additional antihypertensives prn   DASH diet
Continue Lopressor 50 mg BID   Will add additional antihypertensives prn   DASH diet
Increase  Lopressor 75 mg BID   Will add additional antihypertensives prn   DASH diet
DASH diet  c/w Lopressor 25 TID
Increase  Lopressor 100 mg BID   Will add additional antihypertensives prn   DASH diet
Continue Lopressor 50 mg BID   Will add additional antihypertensives prn   DASH diet

## 2022-06-23 NOTE — DISCHARGE NOTE PROVIDER - NSDCMRMEDTOKEN_GEN_ALL_CORE_FT
aspirin 81 mg oral delayed release tablet: 1 tab(s) orally once a day  metoprolol tartrate 100 mg oral tablet: 1 tab(s) orally every 12 hours  oxyCODONE 5 mg oral tablet: 1 tab(s) orally every 12 hours, As Needed -for mild pain MDD:2   pantoprazole 40 mg oral delayed release tablet: 1 tab(s) orally once a day (before a meal)  senna leaf extract oral tablet: 2 tab(s) orally once a day (at bedtime)

## 2022-06-23 NOTE — PROGRESS NOTE ADULT - PROVIDER SPECIALTY LIST ADULT
CT Surgery
Critical Care
CT Surgery
Critical Care
Critical Care
CT Surgery

## 2022-06-23 NOTE — PROGRESS NOTE ADULT - ASSESSMENT
This is a 72 y/o female PMH CAD, severe aortic stenosis, was found to have TVD on cardiac catheterization performed at [facility] in April, denies dyspnea, angina or syncope.  Presents today for aortic valve replacement and CABG X 3.      Pt s/p C2V, AVR (t) with Dr. Chang on 6/16.    6/16: C2V, AVR (t) with Dr. Chang, Meds x 2, +PW, intraop 3u PBRC, 1 platelet, 2 FFP, RIJ intro  6/18: transferred to floor, Meds to LWS, PW 50/8, due to void at 10pm  6/19 VSS, NAD. Pt with 7 hrs afib 110s-130s overnight - Lopressor increased to 50 mg BID, Lopressor 2.5 mg IV x 1/Mg given, PO amio load initiated - then converted to SR 60s-70s at 07:30. Sm R PTX on CXR yesterday - stable on today's CXR. Med CT x 2 with 60cc output overnight, 300cc in 24 hrs - will check drainage s/p ambulation and possibly d/c this afternoon if minimal. Luevano re-inserted overnight for retention, will maintain for now. WBC trending up 8 -->13, MSI and b/l SVG sites CDI with no erythema or drainage noted. UA sent - small leuk esterase, no WBC/bacteria/nitrites, will f/u UCx.   6/20 VSS; RSR 60-70; amio d/c as per Dr. Chang secondary to nausea; bun increased to 47 today--> diuretics d/c; await urine cx; wbc down to 11 today; d/c luevano in am for voiding trial  supplement hypokalemia ; Pw isolated   6/21 VSS; continue lop 50 bid- titrate if needed to q8 for hr control; nausea improved; ck routine echo today; increase activity as tolerated; luevano d/c- pt dtv; bun decreased to 31 today   d/w pw in am if HR stable; urine cx neg discharge planning- home thur/ fri as per DR. Chang   6/22 /90  BB increased -- +PW.  Home Thurs  6/23 D/C PW.  pOSSIBLE D/C TODAY This is a 72 y/o female PMH CAD, severe aortic stenosis, was found to have TVD on cardiac catheterization performed at [facility] in April, denies dyspnea, angina or syncope.  Presents today for aortic valve replacement and CABG X 3.      Pt s/p C2V, AVR (t) with Dr. Chang on 6/16.    6/16: C2V, AVR (t) with Dr. Chang, Meds x 2, +PW, intraop 3u PBRC, 1 platelet, 2 FFP, RIJ intro  6/18: transferred to floor, Meds to LWS, PW 50/8, due to void at 10pm  6/19 VSS, NAD. Pt with 7 hrs afib 110s-130s overnight - Lopressor increased to 50 mg BID, Lopressor 2.5 mg IV x 1/Mg given, PO amio load initiated - then converted to SR 60s-70s at 07:30. Sm R PTX on CXR yesterday - stable on today's CXR. Med CT x 2 with 60cc output overnight, 300cc in 24 hrs - will check drainage s/p ambulation and possibly d/c this afternoon if minimal. Luevano re-inserted overnight for retention, will maintain for now. WBC trending up 8 -->13, MSI and b/l SVG sites CDI with no erythema or drainage noted. UA sent - small leuk esterase, no WBC/bacteria/nitrites, will f/u UCx.   6/20 VSS; RSR 60-70; amio d/c as per Dr. Chang secondary to nausea; bun increased to 47 today--> diuretics d/c; await urine cx; wbc down to 11 today; d/c luevano in am for voiding trial  supplement hypokalemia ; Pw isolated   6/21 VSS; continue lop 50 bid- titrate if needed to q8 for hr control; nausea improved; ck routine echo today; increase activity as tolerated; luevano d/c- pt dtv; bun decreased to 31 today   d/w pw in am if HR stable; urine cx neg discharge planning- home thur/ fri as per DR. Chang   6/22 /90  BB increased -- +PW.  Home Thurs  6/23 D/C PW. d/c planning for today  lft's mild elevation , d/c statin

## 2022-06-23 NOTE — PROGRESS NOTE ADULT - PROBLEM SELECTOR PLAN 1
Continue post-op care   Continue ASA 81, Atorvastatin 80 mg qhs, [id number]  amio d/c secondary nausea 6/20  pw isolated -d/c in am 6/23  Increase activity as tolerated, ambulate 4x daily and w/ PT   Chest PT, encourage incentive spirometry 10x/hr while awake   Pain control with Neurontin, Oxycodone, & Tylenol prn as per ERP protocol   GI ppx w/ Protonix, DVT ppx w/ Lovenox   Dispo: home w/ no PT needs thur if stable

## 2022-06-23 NOTE — DISCHARGE NOTE PROVIDER - NSDCFUSCHEDAPPT_GEN_ALL_CORE_FT
Len Chang  [facility] Physician [facility]  CTSURG [address]  Scheduled Appointment: 07/01/2022

## 2022-06-23 NOTE — DISCHARGE NOTE PROVIDER - NSDCCPCAREPLAN_GEN_ALL_CORE_FT
PRINCIPAL DISCHARGE DIAGNOSIS  Diagnosis: S/P CABG x 2  Assessment and Plan of Treatment:       SECONDARY DISCHARGE DIAGNOSES  Diagnosis: S/P AVR  Assessment and Plan of Treatment:

## 2022-06-23 NOTE — PROGRESS NOTE ADULT - PROBLEM SELECTOR PROBLEM 2
S/P AVR (aortic valve replacement)

## 2022-06-23 NOTE — PROGRESS NOTE ADULT - SUBJECTIVE AND OBJECTIVE BOX
VITAL SIGNS    Telemetry:  NSR 60    Vital Signs Last 24 Hrs  T(C): 36.8 (06-23-22 @ 04:53), Max: 36.8 (06-22-22 @ 13:33)  T(F): 98.2 (06-23-22 @ 04:53), Max: 98.2 (06-22-22 @ 13:33)  HR: 85 (06-23-22 @ 04:53) (73 - 90)  BP: 157/75 (06-23-22 @ 04:53) (150/84 - 166/79)  RR: 18 (06-23-22 @ 04:53) (17 - 18)  SpO2: 95% (06-23-22 @ 04:53) (94% - 95%)                   06-22 @ 07:01  -  06-23 @ 07:00  --------------------------------------------------------  IN: 600 mL / OUT: 2600 mL / NET: -2000 mL          Daily     Daily             CAPILLARY BLOOD GLUCOSE                  Pacing Wires        [  ]   Settings:                                  Isolated  [ X ]    Coumadin    [ ] YES          [ X ]      NO                                   PHYSICAL EXAM        Neurology: alert and oriented x 3, nonfocal, no gross deficits  CV : s1 s2 RRR  Sternal Wound :  CDI , Stable  Lungs: cta  Abdomen: soft, nontender, nondistended, positive bowel sounds, last bowel movement +                       :    voiding   Extremities:    -  edema   /  -   calve tenderness ,    L leg   incisions cdi          acetaminophen     Tablet .. 650 milliGRAM(s) Oral every 6 hours PRN  ALPRAZolam 0.25 milliGRAM(s) Oral every 8 hours PRN  aspirin enteric coated 81 milliGRAM(s) Oral daily  atorvastatin 80 milliGRAM(s) Oral at bedtime  bisacodyl Suppository 10 milliGRAM(s) Rectal once  enoxaparin Injectable 40 milliGRAM(s) SubCutaneous every 24 hours  metoprolol tartrate 100 milliGRAM(s) Oral every 12 hours  oxyCODONE    IR 5 milliGRAM(s) Oral every 4 hours PRN  oxyCODONE    IR 10 milliGRAM(s) Oral every 4 hours PRN  pantoprazole    Tablet 40 milliGRAM(s) Oral before breakfast  polyethylene glycol 3350 17 Gram(s) Oral daily  senna 2 Tablet(s) Oral at bedtime  sodium chloride 0.9% lock flush 3 milliLiter(s) IV Push every 8 hours                    Physical Therapy Rec:   Home  [  ]   Home w/ PT  [  ]  Rehab  [  ]  Discussed with Cardiothoracic Team at AM rounds.     VITAL SIGNS    Telemetry:  NSR 60    Vital Signs Last 24 Hrs  T(C): 36.8 (06-23-22 @ 04:53), Max: 36.8 (06-22-22 @ 13:33)  T(F): 98.2 (06-23-22 @ 04:53), Max: 98.2 (06-22-22 @ 13:33)  HR: 85 (06-23-22 @ 04:53) (73 - 90)  BP: 157/75 (06-23-22 @ 04:53) (150/84 - 166/79)  RR: 18 (06-23-22 @ 04:53) (17 - 18)  SpO2: 95% (06-23-22 @ 04:53) (94% - 95%)                   06-22 @ 07:01  -  06-23 @ 07:00  --------------------------------------------------------  IN: 600 mL / OUT: 2600 mL / NET: -2000 mL          Daily     Daily             CAPILLARY BLOOD GLUCOSE                  Pacing Wires        [  ]   Settings:                                  Isolated  [ X ]    Coumadin    [ ] YES          [ X ]      NO                                   PHYSICAL EXAM        Neurology: alert and oriented x 3, nonfocal, no gross deficits  CV : s1 s2 RRR  Sternal Wound :  CDI , Stable  Lungs: cta  Abdomen: soft, nontender, nondistended, positive bowel sounds, last bowel movement +                       :    voiding   Extremities:    -  edema   /  -   calve tenderness ,    L leg   incisions cdi, r leg oinc cdi          acetaminophen     Tablet .. 650 milliGRAM(s) Oral every 6 hours PRN  ALPRAZolam 0.25 milliGRAM(s) Oral every 8 hours PRN  aspirin enteric coated 81 milliGRAM(s) Oral daily  atorvastatin 80 milliGRAM(s) Oral at bedtime  bisacodyl Suppository 10 milliGRAM(s) Rectal once  enoxaparin Injectable 40 milliGRAM(s) SubCutaneous every 24 hours  metoprolol tartrate 100 milliGRAM(s) Oral every 12 hours  oxyCODONE    IR 5 milliGRAM(s) Oral every 4 hours PRN  oxyCODONE    IR 10 milliGRAM(s) Oral every 4 hours PRN  pantoprazole    Tablet 40 milliGRAM(s) Oral before breakfast  polyethylene glycol 3350 17 Gram(s) Oral daily  senna 2 Tablet(s) Oral at bedtime  sodium chloride 0.9% lock flush 3 milliLiter(s) IV Push every 8 hours                    Physical Therapy Rec:   Home  [  ]   Home w/ PT  [  ]  Rehab  [  ]  Discussed with Cardiothoracic Team at AM rounds.

## 2022-06-23 NOTE — DISCHARGE NOTE PROVIDER - CARE PROVIDER_API CALL
Len Chang (MD)  Surgery; Surgical Critical Care; Thoracic and Cardiac Surgery  [address] [place] [address]  Phone: [phone]  Fax: [phone]  Scheduled Appointment: 07/01/2022 02:00 PM

## 2022-06-24 ENCOUNTER — NON-APPOINTMENT (OUTPATIENT)
Age: 72
End: 2022-06-24

## 2022-06-25 ENCOUNTER — TRANSCRIPTION ENCOUNTER (OUTPATIENT)
Age: 72
End: 2022-06-25

## 2022-06-26 ENCOUNTER — TRANSCRIPTION ENCOUNTER (OUTPATIENT)
Age: 72
End: 2022-06-26

## 2022-06-27 ENCOUNTER — APPOINTMENT (OUTPATIENT)
Dept: CARE COORDINATION | Facility: HOME HEALTH | Age: 72
End: 2022-06-27
Payer: MEDICARE

## 2022-06-27 PROCEDURE — 99024 POSTOP FOLLOW-UP VISIT: CPT

## 2022-06-27 RX ORDER — IRBESARTAN 75 MG/1
75 TABLET ORAL
Refills: 0 | Status: DISCONTINUED | COMMUNITY
End: 2022-06-27

## 2022-06-27 RX ORDER — FLUTICASONE PROPIONATE 50 UG/1
50 SPRAY, METERED NASAL TWICE DAILY
Refills: 0 | Status: DISCONTINUED | COMMUNITY
End: 2022-06-27

## 2022-06-27 RX ORDER — METOPROLOL TARTRATE 100 MG/1
100 TABLET, FILM COATED ORAL TWICE DAILY
Refills: 0 | Status: ACTIVE | COMMUNITY
Start: 2022-06-27

## 2022-06-27 RX ORDER — METOPROLOL SUCCINATE 25 MG/1
25 TABLET, EXTENDED RELEASE ORAL
Refills: 0 | Status: DISCONTINUED | COMMUNITY
End: 2022-06-27

## 2022-06-27 RX ORDER — ATORVASTATIN CALCIUM 40 MG/1
40 TABLET, FILM COATED ORAL
Qty: 30 | Refills: 0 | Status: DISCONTINUED | COMMUNITY
End: 2022-06-27

## 2022-06-28 VITALS
HEART RATE: 88 BPM | BODY MASS INDEX: 23.81 KG/M2 | WEIGHT: 126 LBS | DIASTOLIC BLOOD PRESSURE: 82 MMHG | RESPIRATION RATE: 16 BRPM | OXYGEN SATURATION: 97 % | SYSTOLIC BLOOD PRESSURE: 130 MMHG

## 2022-06-28 PROBLEM — Z95.1 S/P CABG X 2: Status: ACTIVE | Noted: 2022-06-28

## 2022-06-28 PROBLEM — Z95.2 S/P AVR (AORTIC VALVE REPLACEMENT): Status: ACTIVE | Noted: 2022-06-28

## 2022-06-28 NOTE — HISTORY OF PRESENT ILLNESS
[FreeTextEntry1] : This is a 72 y/o female PMH CAD, severe aortic stenosis, was found to have TVD\par on cardiac catheterization performed at [facility] in April,\par denies dyspnea, angina or syncope. Presents today for aortic valve replacement\par and CABG X 3.\par \par Pt s/p C2V, AVR (t) with Dr. Chang on 6/16.\par \par 6/16: C2V, AVR (t) with Dr. Chang, Meds x 2, +PW, intraop 3u PBRC, 1\par platelet, 2 FFP, RIJ intro\par 6/18: transferred to floor, Meds to LWS, PW 50/8, due to void at 10pm\par 6/19 VSS, NAD. Pt with 7 hrs afib 110s-130s overnight - Lopressor increased to\par 50 mg BID, Lopressor 2.5 mg IV x 1/Mg given, PO amio load initiated - then\par converted to SR 60s-70s at 07:30. Sm R PTX on CXR yesterday - stable on today's\par CXR. Med CT x 2 with 60cc output overnight, 300cc in 24 hrs - will check\par drainage s/p ambulation and possibly d/c this afternoon if minimal. Luevano\par re-inserted overnight for retention, will maintain for now. WBC trending up 8\par -->13, MSI and b/l SVG sites CDI with no erythema or drainage noted. UA sent -\par small leuk esterase, no WBC/bacteria/nitrites, will f/u UCx.\par 6/20 VSS; RSR 60-70; amio d/c as per Dr. Chang secondary to nausea; bun\par increased to 47 today--> diuretics d/c; await urine cx; wbc down to 11 today;\par d/c luevano in am for voiding trial\par supplement hypokalemia ; Pw isolated\par 6/21 VSS; continue lop 50 bid- titrate if needed to q8 for hr control; nausea\par improved; ck routine echo today; increase activity as tolerated; luevano d/c- pt\par dtv; bun decreased to 31 today\par d/w pw in am if HR stable; urine cx neg discharge planning- home thur/ fri as\par per DR. Chang\par 6/22 /90 BB increased -- +PW. Home Thurs\par 6/23 D/C PW.\par D/c planning for today\par Lft's mild elevation statin d/c\par \par pt recovering well at home, went for lab work this morning\par education and emotional support provided\par all questions answered

## 2022-06-29 ENCOUNTER — NON-APPOINTMENT (OUTPATIENT)
Age: 72
End: 2022-06-29

## 2022-07-01 ENCOUNTER — APPOINTMENT (OUTPATIENT)
Dept: CARDIOTHORACIC SURGERY | Facility: CLINIC | Age: 72
End: 2022-07-01

## 2022-07-01 VITALS
TEMPERATURE: 98 F | DIASTOLIC BLOOD PRESSURE: 84 MMHG | HEIGHT: 61 IN | HEART RATE: 78 BPM | WEIGHT: 121.5 LBS | OXYGEN SATURATION: 98 % | RESPIRATION RATE: 15 BRPM | BODY MASS INDEX: 22.94 KG/M2 | SYSTOLIC BLOOD PRESSURE: 151 MMHG

## 2022-07-01 DIAGNOSIS — Z95.2 PRESENCE OF PROSTHETIC HEART VALVE: ICD-10-CM

## 2022-07-01 DIAGNOSIS — Z95.1 PRESENCE OF AORTOCORONARY BYPASS GRAFT: ICD-10-CM

## 2022-07-01 PROCEDURE — 99024 POSTOP FOLLOW-UP VISIT: CPT

## 2022-07-01 RX ORDER — OXYCODONE 5 MG/1
5 TABLET ORAL EVERY 6 HOURS
Refills: 0 | Status: COMPLETED | COMMUNITY
Start: 2022-06-27 | End: 2022-07-01

## 2022-07-01 RX ORDER — ATORVASTATIN CALCIUM 40 MG/1
40 TABLET, FILM COATED ORAL
Qty: 60 | Refills: 0 | Status: ACTIVE | COMMUNITY
Start: 2022-07-01 | End: 1900-01-01

## 2022-07-03 ENCOUNTER — TRANSCRIPTION ENCOUNTER (OUTPATIENT)
Age: 72
End: 2022-07-03

## 2022-07-05 RX ORDER — PANTOPRAZOLE 40 MG/1
40 TABLET, DELAYED RELEASE ORAL DAILY
Qty: 30 | Refills: 0 | Status: ACTIVE | COMMUNITY
Start: 2022-06-27 | End: 1900-01-01

## 2022-07-06 RX ORDER — ASPIRIN 81 MG/1
81 TABLET ORAL
Refills: 0 | Status: ACTIVE | COMMUNITY
Start: 2022-05-17

## 2022-07-07 ENCOUNTER — APPOINTMENT (OUTPATIENT)
Dept: CARDIOTHORACIC SURGERY | Facility: CLINIC | Age: 72
End: 2022-07-07

## 2022-07-07 VITALS — HEIGHT: 61 IN | WEIGHT: 121 LBS | BODY MASS INDEX: 22.84 KG/M2

## 2022-07-07 VITALS
SYSTOLIC BLOOD PRESSURE: 136 MMHG | RESPIRATION RATE: 15 BRPM | DIASTOLIC BLOOD PRESSURE: 85 MMHG | HEART RATE: 79 BPM | TEMPERATURE: 97.9 F

## 2022-07-22 ENCOUNTER — TRANSCRIPTION ENCOUNTER (OUTPATIENT)
Age: 72
End: 2022-07-22

## 2022-08-02 ENCOUNTER — NON-APPOINTMENT (OUTPATIENT)
Age: 72
End: 2022-08-02

## 2024-01-02 NOTE — H&P PST ADULT - FUNCTIONAL ASSESSMENT - BASIC MOBILITY PT AGE POP HIDDEN
Medication:   Requested Prescriptions     Pending Prescriptions Disp Refills    omeprazole (PRILOSEC) 40 MG delayed release capsule [Pharmacy Med Name: OMEPRAZOLE DR 40 MG CAPSULE] 30 capsule 3     Sig: TAKE ONE CAPSULE BY MOUTH EVERY MORNING BEFORE BREAKFAST     Last Filled:  8/11/23    Last appt: 8/11/2023   Next appt: 2/13/2024    Last OARRS:        No data to display              
Adult

## 2024-05-16 NOTE — PHYSICAL THERAPY INITIAL EVALUATION ADULT - MANUAL MUSCLE TESTING RESULTS, REHAB EVAL
Patient left heel dressed with 4x4, guaze wrap, and a ace wrap at this time.   strength grossly at least 3/5 throughout

## 2025-05-29 NOTE — OCCUPATIONAL THERAPY INITIAL EVALUATION ADULT - LIVES WITH, PROFILE
Mapping patches applied. Pt lives in split level home with 4 steps to enter, walk in shower. Pt I in ADLs and ambulation prior to admission/spouse

## (undated) DEVICE — VENTING ADAPTER "Y" (RED/BLUE) 7.5"

## (undated) DEVICE — TOURNIQUET SET 12FR (1 RED, 1 BLUE, 1 SNARE) 7"

## (undated) DEVICE — SUT PROLENE 4-0 36" RB-1

## (undated) DEVICE — CATH IV SAFE BC 20G X 1.16" (PINK)

## (undated) DEVICE — SUT SOFSILK 0 30" V-20

## (undated) DEVICE — DRAIN CHANNEL 32FR ROUND HUBLESS FULL FLUTED

## (undated) DEVICE — SUT PROLENE 7-0 4-24" BV-1

## (undated) DEVICE — STOPCOCK 4-WAY W SWIVEL MALE LUER LOCK NON VENTED RED CAP

## (undated) DEVICE — SUT DOUBLE 6 WIRE STERNAL

## (undated) DEVICE — PACK CUSTOM W/INSPIRE OXYGENATOR

## (undated) DEVICE — TUBING INSUFFLATION LAP FILTER 10FT

## (undated) DEVICE — NDL HYPO SAFE 20G X 1.5" (YELLOW)

## (undated) DEVICE — DRAPE 3/4 SHEET W REINFORCEMENT 56X77"

## (undated) DEVICE — SUT TICRON 2-0 36" CV-316 DA

## (undated) DEVICE — SUT ETHIBOND 2-0 4-30" RB-1 GREEN

## (undated) DEVICE — DRAIN RESERVOIR FOR JACKSON PRATT 100CC CARDINAL

## (undated) DEVICE — Device

## (undated) DEVICE — FILTER REINFUSION FOR SALVAGED BLOOD DISP

## (undated) DEVICE — DRAPE TOWEL BLUE 17" X 24"

## (undated) DEVICE — DRAPE LIGHT HANDLE COVER (BLUE)

## (undated) DEVICE — GLV 7.5 PROTEXIS (WHITE)

## (undated) DEVICE — GOWN TRIMAX XXL

## (undated) DEVICE — GOWN XXXL

## (undated) DEVICE — TUBING SUCTION NON CONDUCTIVE 316X18" STERILE

## (undated) DEVICE — BLADE SCALPEL SAFETYLOCK #11

## (undated) DEVICE — DRSG TEGADERM 6"X8"

## (undated) DEVICE — GLV 6.5 PROTEXIS (WHITE)

## (undated) DEVICE — STAPLER SKIN VISI-STAT 35 WIDE

## (undated) DEVICE — TUBING KIT FAST START ATF 40

## (undated) DEVICE — SYS VEIN HARVESTING VIRTUOSAPH PLUS W/ RADIAL

## (undated) DEVICE — CONNECTOR "Y" 1/4 X 1/4 X 1/4"

## (undated) DEVICE — SENSOR MYOCARDIAL TEMP 15MM

## (undated) DEVICE — CONNECTOR "Y" 1/2 X 1/2 X 3/8"

## (undated) DEVICE — WARMING BLANKET DUO-THERM HYPER/HYPOTHERM ADULT

## (undated) DEVICE — BLADE SCALPEL SAFETYLOCK #10

## (undated) DEVICE — SUT SOFSILK 2 60" TIES

## (undated) DEVICE — CONNECTOR "Y" 1/2 X 3/8 X 3/8"

## (undated) DEVICE — SYR LUER LOK 50CC

## (undated) DEVICE — TUBING SUCTION 20FT

## (undated) DEVICE — SOL NORMOSOL-R PH7.4 1000ML

## (undated) DEVICE — MULTIPLE PERFUSION SET FEMALE 1 INLET LEG W 4 LEGS 15" (BLUE/RED)

## (undated) DEVICE — SUT BLUNT SZ 5

## (undated) DEVICE — POSITIONER FOAM EGG CRATE ULNAR 2PCS (PINK)

## (undated) DEVICE — NDL HYPO SAFE 18G X 1.5" (PINK)

## (undated) DEVICE — SUT PROLENE 3-0 36" SH

## (undated) DEVICE — SUCTION YANKAUER NO CONTROL VENT

## (undated) DEVICE — NDL COUNTER FOAM AND MAGNET 40-70

## (undated) DEVICE — SYR LUER LOK 30CC

## (undated) DEVICE — GLV 7.5 DERMAPRENE ULTRA

## (undated) DEVICE — PACING CABLE (BROWN) A/V TEMP SCREW DOWN 12FT

## (undated) DEVICE — VESSEL LOOP EXTRA MAXI-BLUE 0.200" X 22"

## (undated) DEVICE — SUT PROLENE 5-0 30" RB-2

## (undated) DEVICE — SOL IRR BAG NS 0.9% 3000ML

## (undated) DEVICE — PACK UNIVERSAL CARDIAC SUPPLEMNTAL B

## (undated) DEVICE — AORTIC PUNCH 4.0MM STANDARD HANDLE

## (undated) DEVICE — ELCTR HEX BLADE

## (undated) DEVICE — DRSG OPSITE 13.75 X 4"

## (undated) DEVICE — SYR ASEPTO

## (undated) DEVICE — TUBING TRUWAVE PRESSURE MALE/FEMALE 72"

## (undated) DEVICE — SOL IRR POUR NS 0.9% 1000ML

## (undated) DEVICE — SUT PLEDGET PRE PUNCH 4.8 X 9.5 X 1.5 MM

## (undated) DEVICE — SYNOVIS VASCULAR PROBE 1.5MM 15CM

## (undated) DEVICE — BLADE SCALPEL SAFETYLOCK #15

## (undated) DEVICE — SUT BIOSYN 4-0 18" P-12

## (undated) DEVICE — FOLEY TRAY 16FR 5CC LF LUBRISIL ADVANCE TEMP CLOSED

## (undated) DEVICE — NDL TAPR FR EYE 1/2 CIR 3

## (undated) DEVICE — AORTIC PUNCH 5MM STANDARD HANDLE

## (undated) DEVICE — SOL IRR POUR H2O 250ML

## (undated) DEVICE — SUT ETHIBOND EXCEL 3-0 30" V-5 PLDGT 5 GREEN 5 WHITE

## (undated) DEVICE — POSITIONER CARDIAC BUMP

## (undated) DEVICE — DRAIN CHANNEL 19FR ROUND FULL FLUTED

## (undated) DEVICE — SUT STAINLESS STEEL 5 18" SCC

## (undated) DEVICE — DRSG DERMABOND PRINEO 60CM

## (undated) DEVICE — GOWN SLEEVES

## (undated) DEVICE — PREP CHLORAPREP HI-LITE ORANGE 26ML

## (undated) DEVICE — SUMP INTRACARDIAC 20FR 1/4" ADULT

## (undated) DEVICE — DRAPE MAYO STAND 30"

## (undated) DEVICE — BLOWER MISTER AXIUS WITH IV SET

## (undated) DEVICE — SAW BLADE MICROAIRE STERNUM 1X34X9.4MM

## (undated) DEVICE — VESSEL LOOP MAXI-RED  0.120" X 16"

## (undated) DEVICE — SUT SURGICAL STEEL 6 30" BP-1

## (undated) DEVICE — HEMOCONCENTRATOR PERFUSION ID TUBING 1/4"

## (undated) DEVICE — SUT PROLENE 7-0 24" BV175-6

## (undated) DEVICE — STRYKER INTERPULSE HANDPIECE W IRR SUCTION TUBE

## (undated) DEVICE — VISITEC 4X4

## (undated) DEVICE — FEEDING TUBE NG SUMP 16FR 48"

## (undated) DEVICE — GOWN TRIMAX LG

## (undated) DEVICE — GLV 8.5 PROTEXIS (WHITE)

## (undated) DEVICE — DRSG OPSITE 2.5 X 2"

## (undated) DEVICE — DRAIN PLEUROVAC

## (undated) DEVICE — SOL IRR POUR NS 0.9% 500ML

## (undated) DEVICE — DRSG ACE BANDAGE 6"

## (undated) DEVICE — SYR LUER LOK 3CC

## (undated) DEVICE — AORTIC PUNCH 4.0MM LONG LENGTH HANDLE

## (undated) DEVICE — SUT PROLENE 4-0 36" SH

## (undated) DEVICE — TUBING ATS SUCTION LINE

## (undated) DEVICE — SUCTION CATH ARGYLE WHISTLE TIP 14FR STRAIGHT PACKED

## (undated) DEVICE — GLV 7 PROTEXIS (WHITE)

## (undated) DEVICE — LAP PAD 18 X 18"

## (undated) DEVICE — GLV 8 PROTEXIS (WHITE)

## (undated) DEVICE — SUT ETHIBOND 2-0 36" SH

## (undated) DEVICE — BULLDOG SPRING CLIP 6MM SOFT/SOFT

## (undated) DEVICE — PACK UNIVERSAL CARDIAC